# Patient Record
Sex: FEMALE | Race: WHITE | Employment: UNEMPLOYED | URBAN - METROPOLITAN AREA
[De-identification: names, ages, dates, MRNs, and addresses within clinical notes are randomized per-mention and may not be internally consistent; named-entity substitution may affect disease eponyms.]

---

## 2017-01-17 ENCOUNTER — HOSPITAL ENCOUNTER (EMERGENCY)
Age: 27
Discharge: HOME | End: 2017-01-17
Attending: INTERNAL MEDICINE
Payer: SELF-PAY

## 2017-01-17 VITALS
SYSTOLIC BLOOD PRESSURE: 114 MMHG | HEIGHT: 65 IN | TEMPERATURE: 98.1 F | HEART RATE: 80 BPM | WEIGHT: 211 LBS | DIASTOLIC BLOOD PRESSURE: 82 MMHG | BODY MASS INDEX: 35.16 KG/M2 | RESPIRATION RATE: 18 BRPM | OXYGEN SATURATION: 100 %

## 2017-01-17 DIAGNOSIS — K12.2 CELLULITIS OF MOUTH: Primary | ICD-10-CM

## 2017-01-17 LAB
AMPHET UR QL SCN: NEGATIVE
ANION GAP BLD CALC-SCNC: 8 MMOL/L (ref 5–15)
APPEARANCE UR: ABNORMAL
BACTERIA URNS QL MICRO: ABNORMAL /HPF
BARBITURATES UR QL SCN: NEGATIVE
BASOPHILS # BLD AUTO: 0 K/UL (ref 0–0.1)
BASOPHILS # BLD: 0 % (ref 0–1)
BENZODIAZ UR QL: NEGATIVE
BILIRUB UR QL CFM: NEGATIVE
BUN SERPL-MCNC: 6 MG/DL (ref 6–20)
BUN/CREAT SERPL: 8 (ref 12–20)
CALCIUM SERPL-MCNC: 9.1 MG/DL (ref 8.5–10.1)
CANNABINOIDS UR QL SCN: NEGATIVE
CHLORIDE SERPL-SCNC: 104 MMOL/L (ref 97–108)
CO2 SERPL-SCNC: 29 MMOL/L (ref 21–32)
COCAINE UR QL SCN: POSITIVE
COLOR UR: ABNORMAL
CREAT SERPL-MCNC: 0.75 MG/DL (ref 0.55–1.02)
DRUG SCRN COMMENT,DRGCM: ABNORMAL
EOSINOPHIL # BLD: 0.2 K/UL (ref 0–0.4)
EOSINOPHIL NFR BLD: 2 % (ref 0–7)
EPITH CASTS URNS QL MICRO: ABNORMAL /LPF
ERYTHROCYTE [DISTWIDTH] IN BLOOD BY AUTOMATED COUNT: 14 % (ref 11.5–14.5)
GLUCOSE SERPL-MCNC: 88 MG/DL (ref 65–100)
GLUCOSE UR STRIP.AUTO-MCNC: NEGATIVE MG/DL
HCG UR QL: NEGATIVE
HCT VFR BLD AUTO: 39.3 % (ref 35–47)
HGB BLD-MCNC: 12.5 G/DL (ref 11.5–16)
HGB UR QL STRIP: ABNORMAL
KETONES UR QL STRIP.AUTO: NEGATIVE MG/DL
LEUKOCYTE ESTERASE UR QL STRIP.AUTO: ABNORMAL
LYMPHOCYTES # BLD AUTO: 22 % (ref 12–49)
LYMPHOCYTES # BLD: 2 K/UL (ref 0.8–3.5)
MCH RBC QN AUTO: 28.5 PG (ref 26–34)
MCHC RBC AUTO-ENTMCNC: 31.8 G/DL (ref 30–36.5)
MCV RBC AUTO: 89.7 FL (ref 80–99)
METHADONE UR QL: NEGATIVE
MONOCYTES # BLD: 0.7 K/UL (ref 0–1)
MONOCYTES NFR BLD AUTO: 8 % (ref 5–13)
NEUTS SEG # BLD: 6.2 K/UL (ref 1.8–8)
NEUTS SEG NFR BLD AUTO: 68 % (ref 32–75)
NITRITE UR QL STRIP.AUTO: NEGATIVE
OPIATES UR QL: POSITIVE
PCP UR QL: NEGATIVE
PH UR STRIP: 7 [PH] (ref 5–8)
PLATELET # BLD AUTO: 247 K/UL (ref 150–400)
POTASSIUM SERPL-SCNC: 3.8 MMOL/L (ref 3.5–5.1)
PROT UR STRIP-MCNC: ABNORMAL MG/DL
RBC # BLD AUTO: 4.38 M/UL (ref 3.8–5.2)
RBC #/AREA URNS HPF: ABNORMAL /HPF (ref 0–5)
SODIUM SERPL-SCNC: 141 MMOL/L (ref 136–145)
SP GR UR REFRACTOMETRY: 1.02 (ref 1–1.03)
UA: UC IF INDICATED,UAUC: ABNORMAL
UROBILINOGEN UR QL STRIP.AUTO: 2 EU/DL (ref 0.2–1)
WBC # BLD AUTO: 9.2 K/UL (ref 3.6–11)
WBC URNS QL MICRO: ABNORMAL /HPF (ref 0–4)

## 2017-01-17 PROCEDURE — 99284 EMERGENCY DEPT VISIT MOD MDM: CPT

## 2017-01-17 PROCEDURE — 81025 URINE PREGNANCY TEST: CPT

## 2017-01-17 PROCEDURE — 96361 HYDRATE IV INFUSION ADD-ON: CPT

## 2017-01-17 PROCEDURE — 85025 COMPLETE CBC W/AUTO DIFF WBC: CPT | Performed by: INTERNAL MEDICINE

## 2017-01-17 PROCEDURE — 96365 THER/PROPH/DIAG IV INF INIT: CPT

## 2017-01-17 PROCEDURE — 80307 DRUG TEST PRSMV CHEM ANLYZR: CPT | Performed by: INTERNAL MEDICINE

## 2017-01-17 PROCEDURE — 81001 URINALYSIS AUTO W/SCOPE: CPT | Performed by: INTERNAL MEDICINE

## 2017-01-17 PROCEDURE — 80048 BASIC METABOLIC PNL TOTAL CA: CPT | Performed by: INTERNAL MEDICINE

## 2017-01-17 PROCEDURE — 74011250637 HC RX REV CODE- 250/637: Performed by: INTERNAL MEDICINE

## 2017-01-17 PROCEDURE — 96375 TX/PRO/DX INJ NEW DRUG ADDON: CPT

## 2017-01-17 PROCEDURE — 87086 URINE CULTURE/COLONY COUNT: CPT | Performed by: INTERNAL MEDICINE

## 2017-01-17 PROCEDURE — 74011250636 HC RX REV CODE- 250/636: Performed by: INTERNAL MEDICINE

## 2017-01-17 PROCEDURE — 36415 COLL VENOUS BLD VENIPUNCTURE: CPT | Performed by: INTERNAL MEDICINE

## 2017-01-17 RX ORDER — MUPIROCIN 20 MG/G
OINTMENT TOPICAL 3 TIMES DAILY
Qty: 22 G | Refills: 0 | Status: SHIPPED | OUTPATIENT
Start: 2017-01-17 | End: 2021-09-09

## 2017-01-17 RX ORDER — ACETAMINOPHEN AND CODEINE PHOSPHATE 300; 30 MG/1; MG/1
1 TABLET ORAL
Qty: 20 TAB | Refills: 0 | Status: SHIPPED | OUTPATIENT
Start: 2017-01-17 | End: 2021-09-09

## 2017-01-17 RX ORDER — CEPHALEXIN 500 MG/1
500 CAPSULE ORAL 4 TIMES DAILY
Qty: 28 CAP | Refills: 0 | Status: SHIPPED | OUTPATIENT
Start: 2017-01-17 | End: 2017-01-24

## 2017-01-17 RX ORDER — ONDANSETRON 2 MG/ML
4 INJECTION INTRAMUSCULAR; INTRAVENOUS
Status: COMPLETED | OUTPATIENT
Start: 2017-01-17 | End: 2017-01-17

## 2017-01-17 RX ORDER — ACETAMINOPHEN 500 MG
1000 TABLET ORAL ONCE
Status: COMPLETED | OUTPATIENT
Start: 2017-01-17 | End: 2017-01-17

## 2017-01-17 RX ORDER — IBUPROFEN 800 MG/1
800 TABLET ORAL
Qty: 20 TAB | Refills: 0 | Status: SHIPPED | OUTPATIENT
Start: 2017-01-17 | End: 2017-01-24

## 2017-01-17 RX ORDER — CLINDAMYCIN PHOSPHATE 600 MG/50ML
600 INJECTION INTRAVENOUS
Status: COMPLETED | OUTPATIENT
Start: 2017-01-17 | End: 2017-01-17

## 2017-01-17 RX ORDER — SULFAMETHOXAZOLE AND TRIMETHOPRIM 800; 160 MG/1; MG/1
1 TABLET ORAL 2 TIMES DAILY
Qty: 14 TAB | Refills: 0 | Status: SHIPPED | OUTPATIENT
Start: 2017-01-17 | End: 2017-01-24

## 2017-01-17 RX ORDER — KETOROLAC TROMETHAMINE 30 MG/ML
30 INJECTION, SOLUTION INTRAMUSCULAR; INTRAVENOUS
Status: COMPLETED | OUTPATIENT
Start: 2017-01-17 | End: 2017-01-17

## 2017-01-17 RX ORDER — HYDROMORPHONE HYDROCHLORIDE 1 MG/ML
1 INJECTION, SOLUTION INTRAMUSCULAR; INTRAVENOUS; SUBCUTANEOUS
Status: COMPLETED | OUTPATIENT
Start: 2017-01-17 | End: 2017-01-17

## 2017-01-17 RX ADMIN — KETOROLAC TROMETHAMINE 30 MG: 30 INJECTION, SOLUTION INTRAMUSCULAR; INTRAVENOUS at 16:15

## 2017-01-17 RX ADMIN — ACETAMINOPHEN 1000 MG: 500 TABLET ORAL at 16:12

## 2017-01-17 RX ADMIN — HYDROMORPHONE HYDROCHLORIDE 1 MG: 1 INJECTION, SOLUTION INTRAMUSCULAR; INTRAVENOUS; SUBCUTANEOUS at 16:15

## 2017-01-17 RX ADMIN — ONDANSETRON HYDROCHLORIDE 4 MG: 2 SOLUTION INTRAMUSCULAR; INTRAVENOUS at 16:15

## 2017-01-17 RX ADMIN — CLINDAMYCIN PHOSPHATE 600 MG: 600 INJECTION INTRAVENOUS at 16:27

## 2017-01-17 RX ADMIN — SODIUM CHLORIDE 1000 ML: 900 INJECTION, SOLUTION INTRAVENOUS at 16:13

## 2017-01-17 NOTE — DISCHARGE INSTRUCTIONS

## 2017-01-17 NOTE — ED PROVIDER NOTES
HPI Comments: Linsey Hernandez, 27 y.o. female, presents via EMS to [facility] ED with cc of progressively worsening lower lip swelling and lower facial swelling with associated 10/10 facial pain x 2 days. The patient states that onset of symptoms occurred after she popped pimples that were present on her face. The patient notes that she used heroin two days ago. The patient denies history of MRSA, significant medical history, or chance of pregnancy. The patient specifically denies SOB, hand swelling, or other acute complaints at this time. PCP: None    Social history significant for: + Tobacco, + EtOH, + Illicit Drug Use    There are no other complaints, changes, or physical findings at this time. Written by AARON Espinal, as dictated by Jolanta Keita MD.    The history is provided by the patient. No  was used. History reviewed. No pertinent past medical history. History reviewed. No pertinent past surgical history. History reviewed. No pertinent family history. Social History     Social History    Marital status: SINGLE     Spouse name: N/A    Number of children: N/A    Years of education: N/A     Occupational History    Not on file. Social History Main Topics    Smoking status: Current Every Day Smoker    Smokeless tobacco: Not on file    Alcohol use Yes    Drug use: Yes     Special: Heroin    Sexual activity: Not on file     Other Topics Concern    Not on file     Social History Narrative    No narrative on file     ALLERGIES: Review of patient's allergies indicates no known allergies. Review of Systems   Constitutional: Negative for chills, fatigue and fever. HENT: Positive for facial swelling. Negative for congestion, rhinorrhea and sore throat. (+) Lower lip swelling  (+) Facial pain   Eyes: Negative for pain, discharge and visual disturbance. Respiratory: Negative for cough, chest tightness, shortness of breath and wheezing. Cardiovascular: Negative for chest pain, palpitations and leg swelling. Gastrointestinal: Negative for abdominal pain, constipation, diarrhea, nausea and vomiting. Genitourinary: Negative for dysuria, frequency and hematuria. Musculoskeletal: Negative for arthralgias, back pain, joint swelling (Hand swelling) and myalgias. Skin: Negative for rash. Neurological: Negative for dizziness, weakness, light-headedness and headaches. Psychiatric/Behavioral: Negative. All other systems reviewed and are negative. Vitals:    01/17/17 1516   BP: 114/82   Pulse: 80   Resp: 18   Temp: 98.1 °F (36.7 °C)   SpO2: 100%   Weight: 95.7 kg (211 lb)   Height: 5' 5\" (1.651 m)            Physical Exam   Constitutional: She is oriented to person, place, and time. She appears well-developed and well-nourished. HENT:   Head: Normocephalic and atraumatic. Mouth/Throat: Oropharynx is clear and moist.   Lower lip swelling and lower facial swelling consistent with cellulitis  No tongue swelling   Eyes: Conjunctivae and EOM are normal. Pupils are equal, round, and reactive to light. Neck: Normal range of motion. Neck supple. Cardiovascular: Normal rate, regular rhythm and normal heart sounds. Exam reveals no gallop and no friction rub. No murmur heard. Pulmonary/Chest: Effort normal and breath sounds normal. No respiratory distress. She has no wheezes. She has no rales. Abdominal: Soft. Bowel sounds are normal. She exhibits no distension. There is no tenderness. There is no rebound and no guarding. Musculoskeletal: Normal range of motion. She exhibits no edema or tenderness. Lymphadenopathy:     She has no cervical adenopathy. Neurological: She is alert and oriented to person, place, and time. She has normal strength. No cranial nerve deficit or sensory deficit. She displays a negative Romberg sign. Coordination and gait normal.   Skin: Skin is warm and dry. No ecchymosis, no lesion and no rash noted. Rash is not urticarial. She is not diaphoretic. No erythema. Psychiatric: She has a normal mood and affect. Nursing note and vitals reviewed. Written by Delia Russell ED Scribe, as dictated by Estella Marshall MD.    MDM  Number of Diagnoses or Management Options  Diagnosis management comments:   DDx: Facial cellulitis, MSSA vs MRSA, skin popping, heroin/drug abuse, angioedema       Amount and/or Complexity of Data Reviewed  Clinical lab tests: ordered and reviewed  Review and summarize past medical records: yes    Patient Progress  Patient progress: stable    ED Course       Procedures    Progress Note:  5:09 PM  The patient was re-evaluated and reports that she is feeling better. Written by Delia Russell ED Scribe, as dictated by Estella Marshall MD.    LABORATORY TESTS:  Recent Results (from the past 12 hour(s))   CBC WITH AUTOMATED DIFF    Collection Time: 01/17/17  4:07 PM   Result Value Ref Range    WBC 9.2 3.6 - 11.0 K/uL    RBC 4.38 3.80 - 5.20 M/uL    HGB 12.5 11.5 - 16.0 g/dL    HCT 39.3 35.0 - 47.0 %    MCV 89.7 80.0 - 99.0 FL    MCH 28.5 26.0 - 34.0 PG    MCHC 31.8 30.0 - 36.5 g/dL    RDW 14.0 11.5 - 14.5 %    PLATELET 457 597 - 055 K/uL    NEUTROPHILS 68 32 - 75 %    LYMPHOCYTES 22 12 - 49 %    MONOCYTES 8 5 - 13 %    EOSINOPHILS 2 0 - 7 %    BASOPHILS 0 0 - 1 %    ABS. NEUTROPHILS 6.2 1.8 - 8.0 K/UL    ABS. LYMPHOCYTES 2.0 0.8 - 3.5 K/UL    ABS. MONOCYTES 0.7 0.0 - 1.0 K/UL    ABS. EOSINOPHILS 0.2 0.0 - 0.4 K/UL    ABS.  BASOPHILS 0.0 0.0 - 0.1 K/UL   METABOLIC PANEL, BASIC    Collection Time: 01/17/17  4:07 PM   Result Value Ref Range    Sodium 141 136 - 145 mmol/L    Potassium 3.8 3.5 - 5.1 mmol/L    Chloride 104 97 - 108 mmol/L    CO2 29 21 - 32 mmol/L    Anion gap 8 5 - 15 mmol/L    Glucose 88 65 - 100 mg/dL    BUN 6 6 - 20 MG/DL    Creatinine 0.75 0.55 - 1.02 MG/DL    BUN/Creatinine ratio 8 (L) 12 - 20      GFR est AA >60 >60 ml/min/1.73m2    GFR est non-AA >60 >60 ml/min/1.73m2    Calcium 9.1 8.5 - 10.1 MG/DL   HCG URINE, QL. - POC    Collection Time: 01/17/17  5:29 PM   Result Value Ref Range    Pregnancy test,urine (POC) NEGATIVE  NEG       MEDICATIONS GIVEN:  Medications   HYDROmorphone (PF) (DILAUDID) injection 1 mg (1 mg IntraVENous Given 1/17/17 1615)   ondansetron (ZOFRAN) injection 4 mg (4 mg IntraVENous Given 1/17/17 1615)   clindamycin (CLEOCIN) 600mg D5W 50mL IVPB (premix) (0 mg IntraVENous IV Completed 1/17/17 1735)   acetaminophen (TYLENOL) tablet 1,000 mg (1,000 mg Oral Given 1/17/17 1612)   ketorolac (TORADOL) injection 30 mg (30 mg IntraVENous Given 1/17/17 1615)   sodium chloride 0.9 % bolus infusion (1,000 mL IntraVENous New Bag 1/17/17 1613)       IMPRESSION:  1. Cellulitis of mouth        PLAN:  1. Current Discharge Medication List      START taking these medications    Details   cephALEXin (KEFLEX) 500 mg capsule Take 1 Cap by mouth four (4) times daily for 7 days. Qty: 28 Cap, Refills: 0      trimethoprim-sulfamethoxazole (BACTRIM DS) 160-800 mg per tablet Take 1 Tab by mouth two (2) times a day for 7 days. Qty: 14 Tab, Refills: 0      mupirocin (BACTROBAN) 2 % ointment Apply  to affected area three (3) times daily. Apply to area for 10 days  Qty: 22 g, Refills: 0      ibuprofen (MOTRIN) 800 mg tablet Take 1 Tab by mouth every six (6) hours as needed for Pain for up to 7 days. Qty: 20 Tab, Refills: 0      acetaminophen-codeine (TYLENOL-CODEINE #3) 300-30 mg per tablet Take 1 Tab by mouth every six (6) hours as needed for Pain. Max Daily Amount: 4 Tabs. Qty: 20 Tab, Refills: 0           2. Follow-up Information     Follow up With Details Comments Contact Info    [facility] EMERGENCY DEPT   Estefany Toussaint        Return to ED if worse     Discharge Note:  5:36 PM  The pt is ready for discharge. The pt's signs, symptoms, diagnosis, and discharge instructions have been discussed and pt has conveyed their understanding.  The pt is to follow up as recommended or return to ER should their symptoms worsen. Plan has been discussed and pt is in agreement. This note is prepared by Rosa Zhang, acting as a Scribe for Uri Coyle MD.    Uri Coyle MD: The scribe's documentation has been prepared under my direction and personally reviewed by me in its entirety. I confirm that the notes above accurately reflects all work, treatment, procedures, and medical decision making performed by me.

## 2017-01-17 NOTE — ED NOTES
Per pt reports right lower jaw and right lower lip swelling x 2 days after popping a pimple. No tongue swelling noted, denies difficulty swallowing and sob.

## 2017-01-17 NOTE — ED NOTES
Patient (s)  given copy of dc instructions and 5 script(s). Patient(s)  verbalized understanding of instructions and script (s). Patient given a current medication reconciliation form and verbalized understanding of their medications. Patient (s) verbalized understanding of the importance of discussing medications with  his or her physician or clinic when they follow up. Patient alert and oriented and in no acute distress. Pt verbalizes pain scale of 8 out of 10. Patient discharged home ambulatory with .

## 2017-01-17 NOTE — ED TRIAGE NOTES
Pt arrives by EMS with extremely swollen right lower lip and face. Pt reports she popped two pimples two days ago and mouth has been swelling since then.

## 2017-01-17 NOTE — ED NOTES
Patient requesting that MD drain the swelling on her face, pt advised that it's not an abscess. Patient still would like to speak to MD. MD will be notified.

## 2017-01-17 NOTE — ED NOTES
MD at bedside, advising patient that the cellulitis can't be drained. Pt requesting stronger pain medication based on her history. MD advised patient that she need to take what was prescribed.

## 2017-01-19 LAB
BACTERIA SPEC CULT: NORMAL
CC UR VC: NORMAL
SERVICE CMNT-IMP: NORMAL

## 2018-12-18 ENCOUNTER — OFFICE VISIT (OUTPATIENT)
Dept: SURGERY | Age: 28
End: 2018-12-18

## 2018-12-18 VITALS
BODY MASS INDEX: 31.44 KG/M2 | SYSTOLIC BLOOD PRESSURE: 107 MMHG | WEIGHT: 188.7 LBS | DIASTOLIC BLOOD PRESSURE: 74 MMHG | TEMPERATURE: 98.9 F | HEART RATE: 79 BPM | HEIGHT: 65 IN | OXYGEN SATURATION: 100 %

## 2018-12-18 DIAGNOSIS — N63.0 BREAST MASS IN FEMALE: Primary | ICD-10-CM

## 2018-12-18 RX ORDER — NAPROXEN 375 MG/1
375 TABLET ORAL 2 TIMES DAILY WITH MEALS
COMMUNITY
End: 2021-09-09

## 2018-12-18 RX ORDER — IBUPROFEN 600 MG/1
TABLET ORAL 2 TIMES DAILY
COMMUNITY
End: 2021-09-09

## 2018-12-18 NOTE — PROGRESS NOTES
Chief Complaint   Patient presents with    Breast Mass     SEEN AT THE REQUEST OF LISANDRA FLORES FOR 2 BREAST MASSES     1. Have you been to the ER, urgent care clinic since your last visit? Hospitalized since your last visit? NO    2. Have you seen or consulted any other health care providers outside of the [address] [place] [address] since your last visit? Include any pap smears or colon screening.  NO

## 2018-12-21 NOTE — PROGRESS NOTES
To: Cristi Branham NP      From: Ibrahima Sanchez MD    Thank you for sending Debbi Granda to see us. Encounter Date: 12/18/2018  History and Physical    Assessment:     Right breast mass, possibly fluid/hematoma. No known trauma. Plan: Will arrange for percutaneous biopsy. Depending on results, may require referral to a breast surgeon. Subjective:      Debbi Granda is a 34 y.o. female who who is seen in consultation at the request of Cristi Branham NP for evaluation of a new right  breast lump. Patient noticed it. No much pain. Does not recall trauma. Came up quickly. Family history of breast cancer: maternal aunt. Age at menarche: 8.  Age of first pregnancy: 15.  Number of term pregnancies: 4. Age at menopause: na.  Total years of OCP/HRT: 0.  Breast biopsies: 0  SBE's: yes    History reviewed. No pertinent past medical history. Past Surgical History:   Procedure Laterality Date    HX CHOLECYSTECTOMY      HX HERNIA REPAIR      RT. HERNIA REPAIR      Family History   Problem Relation Age of Onset    Hypertension Father     Diabetes Father     Hypertension Paternal Aunt     Cancer Maternal Grandmother     Heart Disease Maternal Grandfather     Cancer Maternal Grandfather     Hypertension Paternal Grandfather      Social History     Tobacco Use    Smoking status: Current Every Day Smoker    Smokeless tobacco: Current User   Substance Use Topics    Alcohol use: No     Frequency: Never     Comment: LAST DRINK 10/10/18      Current Outpatient Medications   Medication Sig    ibuprofen (MOTRIN) 600 mg tablet Take  by mouth two (2) times a day.  naproxen (NAPROSYN) 375 mg tablet Take 375 mg by mouth two (2) times daily (with meals).  trazodone HCl (TRAZODONE PO) Take  by mouth daily.  citalopram hydrobromide (CELEXA PO) Take  by mouth daily.  mupirocin (BACTROBAN) 2 % ointment Apply  to affected area three (3) times daily.  Apply to area for 10 days    acetaminophen-codeine (TYLENOL-CODEINE #3) 300-30 mg per tablet Take 1 Tab by mouth every six (6) hours as needed for Pain. Max Daily Amount: 4 Tabs. No current facility-administered medications for this visit. Allergies   Allergen Reactions    Tramadol Other (comments)       Review of Systems: 10 systems reviewed. See scanned sheet in \"Media\" section. See HPI for pertinent positives and negatives. Objective:        Visit Vitals  /74 (BP 1 Location: Right arm, BP Patient Position: Sitting)   Pulse 79   Temp 98.9 °F (37.2 °C)   Ht 5' 5\" (1.651 m)   Wt 85.6 kg (188 lb 11.2 oz)   SpO2 100%   BMI 31.40 kg/m²        Physical Exam:  General appearance  Alert, cooperative, no distress, appears stated age       Eyes  Anicteric. Neck Supple       Lungs   Clear to auscultation bilaterally   Heart  Regular rate and rhythm. No murmur, rub or gallop   Abdomen   Soft. Bowel sounds normal. No masses,  No organomegaly. Extremities no cyanosis or edema   Pulses 2+ and symmetric   Skin Skin color, texture, turgor normal.    Lymph nodes Cervical, supraclavicular, and axillary nodes normal.   Neurologic Without overt sensory or motor deficit     Breast exam: left breast appear normal, no suspicious masses, no skin or nipple changes or axillary nodes. Right with palpable mass behind nipple. Not tender. No skin changes. On US, appears fluid.          Signed By: Ibrahima Sanchez MD

## 2019-01-09 ENCOUNTER — TELEPHONE (OUTPATIENT)
Dept: MAMMOGRAPHY | Age: 29
End: 2019-01-09

## 2019-01-09 NOTE — TELEPHONE ENCOUNTER
Called Dr. Mcwilliams's office and spoke with nurse Mayte Mullins regarding pt.'s scheduled procedure per [facility], at Dr. Erin Gonzalez office, and Manny Mora, [employer] tech  For 1/16/2019 at 1:30 p.m. (U/S Guided Left breast biopsy with post clip insertion). Informed Karla this procedure must be scheduled with the nurses or Oscar Ramirez in U/[facility] and the patient has no prior imaging at [facility]. Informed Karla we cancelled the appt. And Anastasia, [facility] [employer] stating pt. Needing U/S 1st then possibly schedule for procedure. Karla states Marjorie did not know how to handle this order correctly along with [facility] approving. Myke in U/S made aware. Mayte Mullins states she will notify Dr. Sofiya Kim. Informed Karla to please call back and we will help her with scheduling if pt. Needs.

## 2019-01-14 ENCOUNTER — TELEPHONE (OUTPATIENT)
Dept: MAMMOGRAPHY | Age: 29
End: 2019-01-14

## 2019-01-14 DIAGNOSIS — N64.9 BREAST LESION: Primary | ICD-10-CM

## 2019-01-14 NOTE — TELEPHONE ENCOUNTER
[address] [place] [address] and spoke with Tameka in the medical dept. And she states their  is not in yet - call back around noon to get her rescheduled with Angela Dyson ([phone] ext [phone]).

## 2019-01-14 NOTE — TELEPHONE ENCOUNTER
Received call from Coordination of Care May Cohn) requesting to schedule pt for her breast biopsy - Trish Presser had Anand Grande (rep. From [facility] on the line). Alisha informed Trish Presser availability of 1/23 or 1/25. Toribio Mable date of 1/23/2019 at 0930 with arrival time of 0830 here at [facility] and Anand Grande agreed to date/time/place. Carmenza Barksdale at [phone] ext [phone] to assist in filling out biopsy form and to inform Anand Grande that the patient can eat prior to her procedure. Anand Grande states she only takes Naproxen 375 mg (informed Anand Grande that pt. Should not take the Naproxen on Wednesday prior to her procedure and Anand Grande states \"that is fine\").     Michael Metcalf in U/S to place pt on the wall calendar for above date/ti

## 2019-01-14 NOTE — TELEPHONE ENCOUNTER
Called Dr. Mcwilliams's office and spoke with [facility] regarding pt being rescheduled. Marjorie states pt had U/S in their office-ask if they could send on CC or PACS and Marjorie states \"I don't think we can do that\". Then inquired if a disk could be made and we could upload to PACS and Marjorie said \"they can't do that\". Informed Marjorie since we have no imaging here at [address] for the doctors to evaluate, we would schedule her for an U/S of her breast with ? Biopsy same day per Dr. Winston Settles request ([facility] and problems scheduling at different times). Marjorie informed me of the phone number to call and arrange pt.'s appt. ([phone] ext [phone]).

## 2019-01-14 NOTE — TELEPHONE ENCOUNTER
Called Dr. Mcwilliams's office and spoke with [facility] informing her of the patients appt. Date/time/place. Encouraged to call back for any other questions/concerns regarding pt.'s appt.

## 2019-01-23 ENCOUNTER — HOSPITAL ENCOUNTER (OUTPATIENT)
Dept: ULTRASOUND IMAGING | Age: 29
Discharge: HOME | End: 2019-01-23
Attending: SURGERY
Payer: SELF-PAY

## 2019-01-23 ENCOUNTER — HOSPITAL ENCOUNTER (OUTPATIENT)
Dept: MAMMOGRAPHY | Age: 29
Discharge: HOME | End: 2019-01-23
Attending: SURGERY
Payer: SELF-PAY

## 2019-01-23 VITALS — WEIGHT: 196 LBS | HEIGHT: 64 IN | BODY MASS INDEX: 33.46 KG/M2

## 2019-01-23 DIAGNOSIS — N63.0 BREAST MASS IN FEMALE: ICD-10-CM

## 2019-01-23 DIAGNOSIS — R92.8 ABNORMAL MAMMOGRAM OF RIGHT BREAST: ICD-10-CM

## 2019-01-23 PROCEDURE — 19083 BX BREAST 1ST LESION US IMAG: CPT

## 2019-01-23 PROCEDURE — 87205 SMEAR GRAM STAIN: CPT

## 2019-01-23 PROCEDURE — 19000 PUNCTURE ASPIR CYST BREAST: CPT

## 2019-01-23 PROCEDURE — 87075 CULTR BACTERIA EXCEPT BLOOD: CPT

## 2019-01-23 RX ORDER — LIDOCAINE HYDROCHLORIDE 10 MG/ML
5 INJECTION, SOLUTION EPIDURAL; INFILTRATION; INTRACAUDAL; PERINEURAL
Status: DISPENSED | OUTPATIENT
Start: 2019-01-23 | End: 2019-01-23

## 2019-01-23 NOTE — PROGRESS NOTES
Received pt. In U/S with cuffs and shackles on. Pt awake, alert and oriented x3. Pt states no pain but she can feel it if she lays on it.

## 2019-01-23 NOTE — PROGRESS NOTES
Pt discharged with verbal instructions per Dr. Ilda Vidal and nurse. Bandaid placed on site per U/S [employer].

## 2019-01-23 NOTE — PROGRESS NOTES
Biopsy cancelled - Dr. Chica Durán aspirated fluid from right breast - requested to send for cultures (aerobic and anaerobic).

## 2019-01-27 LAB
BACTERIA SPEC CULT: NORMAL
SERVICE CMNT-IMP: NORMAL

## 2019-01-29 ENCOUNTER — TELEPHONE (OUTPATIENT)
Dept: MAMMOGRAPHY | Age: 29
End: 2019-01-29

## 2019-01-29 NOTE — TELEPHONE ENCOUNTER
Routed culture results to Dr. Nimco Palacios and spoke w/his nurse, Francisco Murphy which advised to fax them to her - w/confirmation received.

## 2019-01-29 NOTE — TELEPHONE ENCOUNTER
Dr AIDE Barclay advised of culture results - advised to notify Dr. Corey Gordon - faxed and advised office, see previous note

## 2019-01-30 LAB
BACTERIA SPEC CULT: NORMAL
BACTERIA SPEC CULT: NORMAL
GRAM STN SPEC: NORMAL
GRAM STN SPEC: NORMAL
SERVICE CMNT-IMP: NORMAL

## 2021-09-09 ENCOUNTER — APPOINTMENT (OUTPATIENT)
Dept: GENERAL RADIOLOGY | Age: 31
End: 2021-09-09
Attending: EMERGENCY MEDICINE
Payer: MEDICAID

## 2021-09-09 ENCOUNTER — HOSPITAL ENCOUNTER (EMERGENCY)
Age: 31
Discharge: HOME | End: 2021-09-09
Attending: EMERGENCY MEDICINE
Payer: MEDICAID

## 2021-09-09 VITALS
RESPIRATION RATE: 16 BRPM | OXYGEN SATURATION: 96 % | HEIGHT: 64 IN | HEART RATE: 72 BPM | TEMPERATURE: 98.9 F | DIASTOLIC BLOOD PRESSURE: 62 MMHG | WEIGHT: 170 LBS | SYSTOLIC BLOOD PRESSURE: 99 MMHG | BODY MASS INDEX: 29.02 KG/M2

## 2021-09-09 DIAGNOSIS — E87.6 HYPOKALEMIA: ICD-10-CM

## 2021-09-09 DIAGNOSIS — J18.9 COMMUNITY ACQUIRED PNEUMONIA OF RIGHT MIDDLE LOBE OF LUNG: ICD-10-CM

## 2021-09-09 DIAGNOSIS — N12 PYELONEPHRITIS: Primary | ICD-10-CM

## 2021-09-09 LAB
ALBUMIN SERPL-MCNC: 3.2 G/DL (ref 3.5–5)
ALBUMIN/GLOB SERPL: 0.7 {RATIO} (ref 1.1–2.2)
ALP SERPL-CCNC: 59 U/L (ref 45–117)
ALT SERPL-CCNC: 20 U/L (ref 12–78)
ANION GAP SERPL CALC-SCNC: 9 MMOL/L (ref 5–15)
APPEARANCE UR: ABNORMAL
AST SERPL-CCNC: 22 U/L (ref 15–37)
BACTERIA URNS QL MICRO: ABNORMAL /HPF
BASOPHILS # BLD: 0 K/UL (ref 0–0.1)
BASOPHILS NFR BLD: 0 % (ref 0–1)
BILIRUB SERPL-MCNC: 1 MG/DL (ref 0.2–1)
BILIRUB UR QL CFM: NEGATIVE
BUN SERPL-MCNC: 9 MG/DL (ref 6–20)
BUN/CREAT SERPL: 9 (ref 12–20)
CALCIUM SERPL-MCNC: 8.3 MG/DL (ref 8.5–10.1)
CHLORIDE SERPL-SCNC: 99 MMOL/L (ref 97–108)
CO2 SERPL-SCNC: 26 MMOL/L (ref 21–32)
COLOR UR: ABNORMAL
CREAT SERPL-MCNC: 0.97 MG/DL (ref 0.55–1.02)
DIFFERENTIAL METHOD BLD: ABNORMAL
EOSINOPHIL # BLD: 0 K/UL (ref 0–0.4)
EOSINOPHIL NFR BLD: 0 % (ref 0–7)
EPITH CASTS URNS QL MICRO: ABNORMAL /LPF
ERYTHROCYTE [DISTWIDTH] IN BLOOD BY AUTOMATED COUNT: 14.7 % (ref 11.5–14.5)
FLUAV RNA SPEC QL NAA+PROBE: NOT DETECTED
FLUBV RNA SPEC QL NAA+PROBE: NOT DETECTED
GLOBULIN SER CALC-MCNC: 4.9 G/DL (ref 2–4)
GLUCOSE SERPL-MCNC: 115 MG/DL (ref 65–100)
GLUCOSE UR STRIP.AUTO-MCNC: NEGATIVE MG/DL
HCT VFR BLD AUTO: 33.6 % (ref 35–47)
HGB BLD-MCNC: 10.7 G/DL (ref 11.5–16)
HGB UR QL STRIP: ABNORMAL
IMM GRANULOCYTES # BLD AUTO: 0.1 K/UL (ref 0–0.04)
IMM GRANULOCYTES NFR BLD AUTO: 1 % (ref 0–0.5)
KETONES UR QL STRIP.AUTO: NEGATIVE MG/DL
LACTATE SERPL-SCNC: 1 MMOL/L (ref 0.4–2)
LEUKOCYTE ESTERASE UR QL STRIP.AUTO: ABNORMAL
LYMPHOCYTES # BLD: 1.4 K/UL (ref 0.8–3.5)
LYMPHOCYTES NFR BLD: 11 % (ref 12–49)
MCH RBC QN AUTO: 25.9 PG (ref 26–34)
MCHC RBC AUTO-ENTMCNC: 31.8 G/DL (ref 30–36.5)
MCV RBC AUTO: 81.4 FL (ref 80–99)
MONOCYTES # BLD: 1.3 K/UL (ref 0–1)
MONOCYTES NFR BLD: 10 % (ref 5–13)
NEUTS SEG # BLD: 10 K/UL (ref 1.8–8)
NEUTS SEG NFR BLD: 78 % (ref 32–75)
NITRITE UR QL STRIP.AUTO: POSITIVE
NRBC # BLD: 0 K/UL (ref 0–0.01)
NRBC BLD-RTO: 0 PER 100 WBC
PH UR STRIP: 5.5 [PH] (ref 5–8)
PLATELET # BLD AUTO: 195 K/UL (ref 150–400)
PMV BLD AUTO: 9.3 FL (ref 8.9–12.9)
POTASSIUM SERPL-SCNC: 3.1 MMOL/L (ref 3.5–5.1)
PROT SERPL-MCNC: 8.1 G/DL (ref 6.4–8.2)
PROT UR STRIP-MCNC: 30 MG/DL
RBC # BLD AUTO: 4.13 M/UL (ref 3.8–5.2)
RBC #/AREA URNS HPF: ABNORMAL /HPF (ref 0–5)
SARS-COV-2, COV2: NOT DETECTED
SODIUM SERPL-SCNC: 134 MMOL/L (ref 136–145)
SP GR UR REFRACTOMETRY: 1.02 (ref 1–1.03)
TROPONIN I SERPL-MCNC: <0.05 NG/ML
UA: UC IF INDICATED,UAUC: ABNORMAL
UROBILINOGEN UR QL STRIP.AUTO: 1 EU/DL (ref 0.2–1)
WBC # BLD AUTO: 12.9 K/UL (ref 3.6–11)
WBC URNS QL MICRO: ABNORMAL /HPF (ref 0–4)

## 2021-09-09 PROCEDURE — 87077 CULTURE AEROBIC IDENTIFY: CPT

## 2021-09-09 PROCEDURE — 85025 COMPLETE CBC W/AUTO DIFF WBC: CPT

## 2021-09-09 PROCEDURE — 96361 HYDRATE IV INFUSION ADD-ON: CPT

## 2021-09-09 PROCEDURE — 71045 X-RAY EXAM CHEST 1 VIEW: CPT

## 2021-09-09 PROCEDURE — 83605 ASSAY OF LACTIC ACID: CPT

## 2021-09-09 PROCEDURE — 80053 COMPREHEN METABOLIC PANEL: CPT

## 2021-09-09 PROCEDURE — 87186 SC STD MICRODIL/AGAR DIL: CPT

## 2021-09-09 PROCEDURE — 84484 ASSAY OF TROPONIN QUANT: CPT

## 2021-09-09 PROCEDURE — 96374 THER/PROPH/DIAG INJ IV PUSH: CPT

## 2021-09-09 PROCEDURE — 75810000457 HC INSERT PICC CATHETER LVL 3 5183

## 2021-09-09 PROCEDURE — 74011250636 HC RX REV CODE- 250/636: Performed by: EMERGENCY MEDICINE

## 2021-09-09 PROCEDURE — 74011250637 HC RX REV CODE- 250/637: Performed by: EMERGENCY MEDICINE

## 2021-09-09 PROCEDURE — 81001 URINALYSIS AUTO W/SCOPE: CPT

## 2021-09-09 PROCEDURE — 87040 BLOOD CULTURE FOR BACTERIA: CPT

## 2021-09-09 PROCEDURE — 99284 EMERGENCY DEPT VISIT MOD MDM: CPT

## 2021-09-09 PROCEDURE — 87086 URINE CULTURE/COLONY COUNT: CPT

## 2021-09-09 PROCEDURE — 74011000250 HC RX REV CODE- 250: Performed by: EMERGENCY MEDICINE

## 2021-09-09 PROCEDURE — 36415 COLL VENOUS BLD VENIPUNCTURE: CPT

## 2021-09-09 PROCEDURE — 87636 SARSCOV2 & INF A&B AMP PRB: CPT

## 2021-09-09 PROCEDURE — 87106 FUNGI IDENTIFICATION YEAST: CPT

## 2021-09-09 PROCEDURE — 93005 ELECTROCARDIOGRAM TRACING: CPT

## 2021-09-09 RX ORDER — POTASSIUM CHLORIDE 750 MG/1
40 TABLET, FILM COATED, EXTENDED RELEASE ORAL
Status: COMPLETED | OUTPATIENT
Start: 2021-09-09 | End: 2021-09-09

## 2021-09-09 RX ORDER — ACETAMINOPHEN 500 MG
1000 TABLET ORAL ONCE
Status: COMPLETED | OUTPATIENT
Start: 2021-09-09 | End: 2021-09-09

## 2021-09-09 RX ORDER — SODIUM CHLORIDE 0.9 % (FLUSH) 0.9 %
5-10 SYRINGE (ML) INJECTION AS NEEDED
Status: DISCONTINUED | OUTPATIENT
Start: 2021-09-09 | End: 2021-09-09 | Stop reason: HOSPADM

## 2021-09-09 RX ORDER — LEVOFLOXACIN 750 MG/1
750 TABLET ORAL DAILY
Qty: 7 TABLET | Refills: 0 | Status: SHIPPED | OUTPATIENT
Start: 2021-09-09 | End: 2021-09-16

## 2021-09-09 RX ADMIN — ACETAMINOPHEN 1000 MG: 500 TABLET ORAL at 17:35

## 2021-09-09 RX ADMIN — SODIUM CHLORIDE 641 ML: 9 INJECTION, SOLUTION INTRAVENOUS at 17:36

## 2021-09-09 RX ADMIN — SODIUM CHLORIDE 1000 ML: 9 INJECTION, SOLUTION INTRAVENOUS at 17:35

## 2021-09-09 RX ADMIN — POTASSIUM CHLORIDE 40 MEQ: 750 TABLET, EXTENDED RELEASE ORAL at 19:05

## 2021-09-09 RX ADMIN — CEFTRIAXONE SODIUM 1 G: 1 INJECTION, POWDER, FOR SOLUTION INTRAMUSCULAR; INTRAVENOUS at 18:06

## 2021-09-09 NOTE — ED TRIAGE NOTES
Pt in with fever, fatigue, shortness of breath, chest pain with cough x 3 days. Pt denies taking any medications today.

## 2021-09-09 NOTE — ED NOTES
Emergency Department Nursing Plan of Care       The Nursing Plan of Care is developed from the Nursing assessment and Emergency Department Attending provider initial evaluation. The plan of care may be reviewed in the ED Provider note.     The Plan of Care was developed with the following considerations:   Patient / Family readiness to learn indicated by:verbalized understanding  Persons(s) to be included in education: patient  Barriers to Learning/Limitations:No    Signed     Adrienne Del Real    9/9/2021   5:03 PM

## 2021-09-09 NOTE — ED PROVIDER NOTES
EMERGENCY DEPARTMENT HISTORY AND PHYSICAL EXAM      Date: 9/9/2021  Patient Name: Ozzy Boston    History of Presenting Illness     Chief Complaint   Patient presents with    Fever    Fatigue     History Provided By: Patient    HPI: Ozzy Boston, 31 y.o. female with no significant past medical history who presents via private vehicle to the ED with cc of subjective fevers, shortness of breath, cough, fatigue, and right sided chest/flank pain for the past 3 days. Patient denies taking any medications for the symptoms. She does state that her fevers and chills have waxed and waned. She denies any sick contacts, specifically any COVID-19 exposures. She is unvaccinated for COVID-19. She does inject heroin daily and uses cocaine. She endorses dysuria and urinary frequency but denies any GI symptoms. Her cough is nonproductive. Coughing and taking deep breaths make her right flank/chest pain worse and nothing makes her pain better. She describes the pain as a dull/aching pain that does not radiate. PMHx: Polysubstance abuse  Social Hx: Smokes 1/2 pack/day, denies alcohol use, recreational heroin and cocaine use    PCP: Linda Renee NP    There are no other complaints, changes, or physical findings at this time. No current facility-administered medications on file prior to encounter. Current Outpatient Medications on File Prior to Encounter   Medication Sig Dispense Refill    [DISCONTINUED] omeprazole magnesium (PRILOSEC PO) Take  by mouth.  [DISCONTINUED] ibuprofen (MOTRIN) 600 mg tablet Take  by mouth two (2) times a day.  [DISCONTINUED] naproxen (NAPROSYN) 375 mg tablet Take 375 mg by mouth two (2) times daily (with meals).  [DISCONTINUED] trazodone HCl (TRAZODONE PO) Take  by mouth daily.  [DISCONTINUED] citalopram hydrobromide (CELEXA PO) Take  by mouth daily.  [DISCONTINUED] mupirocin (BACTROBAN) 2 % ointment Apply  to affected area three (3) times daily. Apply to area for 10 days 22 g 0    [DISCONTINUED] acetaminophen-codeine (TYLENOL-CODEINE #3) 300-30 mg per tablet Take 1 Tab by mouth every six (6) hours as needed for Pain. Max Daily Amount: 4 Tabs. 20 Tab 0     Past History     Past Medical History:  History reviewed. No pertinent past medical history. Past Surgical History:  Past Surgical History:   Procedure Laterality Date    HX CHOLECYSTECTOMY      HX HERNIA REPAIR      RT. HERNIA REPAIR     Family History:  Family History   Problem Relation Age of Onset    Hypertension Father     Diabetes Father     Hypertension Paternal Aunt     Cancer Maternal Grandmother     Heart Disease Maternal Grandfather     Cancer Maternal Grandfather     Hypertension Paternal Grandfather      Social History:  Social History     Tobacco Use    Smoking status: Current Every Day Smoker    Smokeless tobacco: Current User   Substance Use Topics    Alcohol use: No     Comment: LAST DRINK 10/10/18    Drug use: Yes     Types: Heroin     Allergies: Allergies   Allergen Reactions    Tramadol Other (comments)     Pt denies tramadol allergy. Review of Systems   Review of Systems   Constitutional: Positive for chills, fatigue and fever. HENT: Negative for congestion, rhinorrhea, sneezing and sore throat. Eyes: Negative for redness and visual disturbance. Respiratory: Positive for cough and shortness of breath. Cardiovascular: Positive for chest pain. Negative for leg swelling. Gastrointestinal: Negative for abdominal pain, nausea and vomiting. Genitourinary: Positive for flank pain. Negative for difficulty urinating and frequency. Musculoskeletal: Positive for myalgias. Negative for back pain and neck stiffness. Skin: Negative for rash. Neurological: Negative for dizziness, syncope, weakness and headaches. Hematological: Negative for adenopathy. All other systems reviewed and are negative.     Physical Exam   Physical Exam  Vitals and nursing note reviewed. Constitutional:       Appearance: Normal appearance. She is well-developed. HENT:      Head: Normocephalic and atraumatic. Eyes:      Conjunctiva/sclera: Conjunctivae normal.   Cardiovascular:      Rate and Rhythm: Regular rhythm. Tachycardia present. Pulses: Normal pulses. Heart sounds: Normal heart sounds, S1 normal and S2 normal. No murmur heard. Pulmonary:      Effort: Pulmonary effort is normal. No respiratory distress. Breath sounds: Examination of the right-lower field reveals decreased breath sounds. Decreased breath sounds present. No wheezing. Abdominal:      General: Bowel sounds are normal. There is no distension. Palpations: Abdomen is soft. Tenderness: There is no abdominal tenderness. There is right CVA tenderness. There is no left CVA tenderness, guarding or rebound. Musculoskeletal:         General: Normal range of motion. Cervical back: Full passive range of motion without pain, normal range of motion and neck supple. Skin:     General: Skin is warm and dry. Findings: No rash. Neurological:      Mental Status: She is alert and oriented to person, place, and time. Psychiatric:         Speech: Speech normal.         Behavior: Behavior normal.         Thought Content:  Thought content normal.         Judgment: Judgment normal.       Diagnostic Study Results   Labs -     Recent Results (from the past 12 hour(s))   URINALYSIS W/ REFLEX CULTURE    Collection Time: 09/09/21  5:28 PM    Specimen: Miscellaneous sample; Urine    Urine specimen   Result Value Ref Range    Color DARK YELLOW      Appearance CLOUDY (A) CLEAR      Specific gravity 1.025 1.003 - 1.030      pH (UA) 5.5 5.0 - 8.0      Protein 30 (A) NEG mg/dL    Glucose Negative NEG mg/dL    Ketone Negative NEG mg/dL    Blood LARGE (A) NEG      Urobilinogen 1.0 0.2 - 1.0 EU/dL    Nitrites Positive (A) NEG      Leukocyte Esterase SMALL (A) NEG      WBC 0-4 0 - 4 /hpf    RBC 0-5 0 - 5 /hpf    Epithelial cells MODERATE (A) FEW /lpf    Bacteria 2+ (A) NEG /hpf    UA:UC IF INDICATED CULTURE NOT INDICATED BY UA RESULT CNI     METABOLIC PANEL, COMPREHENSIVE    Collection Time: 09/09/21  5:28 PM   Result Value Ref Range    Sodium 134 (L) 136 - 145 mmol/L    Potassium 3.1 (L) 3.5 - 5.1 mmol/L    Chloride 99 97 - 108 mmol/L    CO2 26 21 - 32 mmol/L    Anion gap 9 5 - 15 mmol/L    Glucose 115 (H) 65 - 100 mg/dL    BUN 9 6 - 20 MG/DL    Creatinine 0.97 0.55 - 1.02 MG/DL    BUN/Creatinine ratio 9 (L) 12 - 20      GFR est AA >60 >60 ml/min/1.73m2    GFR est non-AA >60 >60 ml/min/1.73m2    Calcium 8.3 (L) 8.5 - 10.1 MG/DL    Bilirubin, total 1.0 0.2 - 1.0 MG/DL    ALT (SGPT) 20 12 - 78 U/L    AST (SGOT) 22 15 - 37 U/L    Alk. phosphatase 59 45 - 117 U/L    Protein, total 8.1 6.4 - 8.2 g/dL    Albumin 3.2 (L) 3.5 - 5.0 g/dL    Globulin 4.9 (H) 2.0 - 4.0 g/dL    A-G Ratio 0.7 (L) 1.1 - 2.2     CBC WITH AUTOMATED DIFF    Collection Time: 09/09/21  5:28 PM   Result Value Ref Range    WBC 12.9 (H) 3.6 - 11.0 K/uL    RBC 4.13 3.80 - 5.20 M/uL    HGB 10.7 (L) 11.5 - 16.0 g/dL    HCT 33.6 (L) 35.0 - 47.0 %    MCV 81.4 80.0 - 99.0 FL    MCH 25.9 (L) 26.0 - 34.0 PG    MCHC 31.8 30.0 - 36.5 g/dL    RDW 14.7 (H) 11.5 - 14.5 %    PLATELET 728 718 - 192 K/uL    MPV 9.3 8.9 - 12.9 FL    NRBC 0.0 0  WBC    ABSOLUTE NRBC 0.00 0.00 - 0.01 K/uL    NEUTROPHILS 78 (H) 32 - 75 %    LYMPHOCYTES 11 (L) 12 - 49 %    MONOCYTES 10 5 - 13 %    EOSINOPHILS 0 0 - 7 %    BASOPHILS 0 0 - 1 %    IMMATURE GRANULOCYTES 1 (H) 0.0 - 0.5 %    ABS. NEUTROPHILS 10.0 (H) 1.8 - 8.0 K/UL    ABS. LYMPHOCYTES 1.4 0.8 - 3.5 K/UL    ABS. MONOCYTES 1.3 (H) 0.0 - 1.0 K/UL    ABS. EOSINOPHILS 0.0 0.0 - 0.4 K/UL    ABS. BASOPHILS 0.0 0.0 - 0.1 K/UL    ABS. IMM.  GRANS. 0.1 (H) 0.00 - 0.04 K/UL    DF AUTOMATED     TROPONIN I    Collection Time: 09/09/21  5:28 PM   Result Value Ref Range    Troponin-I, Qt. <0.05 <0.05 ng/mL   LACTIC ACID    Collection Time: 09/09/21  5:28 PM   Result Value Ref Range    Lactic acid 1.0 0.4 - 2.0 MMOL/L   COVID-19 WITH INFLUENZA A/B    Collection Time: 09/09/21  5:28 PM   Result Value Ref Range    SARS-CoV-2 Not detected NOTD      Influenza A by PCR Not detected      Influenza B by PCR Not detected     BILIRUBIN, CONFIRM    Collection Time: 09/09/21  5:28 PM   Result Value Ref Range    Bilirubin UA, confirm Negative NEG     EKG, 12 LEAD, INITIAL    Collection Time: 09/09/21  5:43 PM   Result Value Ref Range    Ventricular Rate 87 BPM    Atrial Rate 87 BPM    P-R Interval 142 ms    QRS Duration 84 ms    Q-T Interval 354 ms    QTC Calculation (Bezet) 425 ms    Calculated P Axis 78 degrees    Calculated R Axis 76 degrees    Calculated T Axis 54 degrees    Diagnosis       Normal sinus rhythm  Normal ECG  No previous ECGs available         Radiologic Studies -   XR CHEST PORT   Final Result   Right middle lobe infiltrate. XR CHEST PORT    Result Date: 9/9/2021  Right middle lobe infiltrate. Medical Decision Making   I am the first provider for this patient. I reviewed the vital signs, available nursing notes, past medical history, past surgical history, family history and social history. Vital Signs-Reviewed the patient's vital signs. Patient Vitals for the past 24 hrs:   Temp Pulse Resp BP SpO2   09/09/21 1803  87 18 (!) 97/57 99 %   09/09/21 1624 (!) 100.5 °F (38.1 °C) (!) 108 22 119/63 96 %     Pulse Oximetry Analysis - 96% on RA (borderline)    Cardiac Monitor:   Rate: 108 bpm  Rhythm: Sinus Tachycardia     ED EKG interpretation: 17:43  Rhythm: normal sinus rhythm; and regular . Rate (approx.): 87; Axis: normal; P wave: normal; QRS interval: normal ; ST/T wave: normal; Other findings: normal. This EKG was interpreted by Sigrid Luo MD,ED Provider.     Records Reviewed: Nursing Notes and Old Medical Records    Provider Notes (Medical Decision Making):   24-year-old female presents with shortness of breath, right-sided chest and flank pain, and urinary frequency as well as fevers and chills for the past 3 days. Differential includes COVID-19, pneumonia, UTI, pyelonephritis, cholecystitis, and viral illness. ED Course:   Initial assessment performed. The patients presenting problems have been discussed, and they are in agreement with the care plan formulated and outlined with them. I have encouraged them to ask questions as they arise throughout their visit. Procedure Note- Peripheral IV Access  5:24 PM  Performed by: MD Tho Larry MD gained IV access using a 20 gauge needle because the patient had no vascular access. After cleaning the site with alcohol prep, the Right basilic vein was localized with ultrasound guidance in an anterior approach. Line confirmation was obtained by direct visualization and good blood return. No anaesthetic was used. The line was successfully flushed with normal saline and was secured with transparent tape. Estimated blood loss: 0mL  The procedure took 20 minutes, and pt tolerated well. Patient's Covid swab is negative. She has nitrates and 2+ bacteria in her urine as well as a right middle lobe pneumonia. Will treat with p.o. Levaquin to cover both of her bacterial infections and have her follow-up with outpatient primary care. Progress Note:   Updated pt on all returned results and findings. Discussed the importance of proper follow up as referred below along with return precautions. Pt in agreement with the care plan and expresses agreement with and understanding of all items discussed. Disposition:  Discharge Note:  The pt is ready for discharge. The pt's signs, symptoms, diagnosis, and discharge instructions have been discussed and pt has conveyed their understanding. The pt is to follow up as recommended or return to ER should their symptoms worsen. Plan has been discussed and pt is in agreement. PLAN:  1.    Current Discharge Medication List      START taking these medications    Details   levoFLOXacin (Levaquin) 750 mg tablet Take 1 Tablet by mouth daily for 7 days. Qty: 7 Tablet, Refills: 0  Start date: 9/9/2021, End date: 9/16/2021           2. Follow-up Information     Follow up With Specialties Details Why Contact Info    Jose Owens NP Nurse Practitioner Schedule an appointment as soon as possible for a visit   [address] [place] [address]  [phone]      [facility] EMERGENCY DEPT Emergency Medicine  As needed, If symptoms worsen [facility]  [phone]        Return to ED if worse     Diagnosis     Clinical Impression:   1. Pyelonephritis    2. Community acquired pneumonia of right middle lobe of lung    3. Hypokalemia            Please note that this dictation was completed with Dragon, computer voice recognition software. Quite often unanticipated grammatical, syntax, homophones, and other interpretive errors are inadvertently transcribed by the computer software. Please disregard these errors. Additionally, please excuse any errors that have escaped final proofreading.

## 2021-09-10 LAB
ATRIAL RATE: 87 BPM
CALCULATED P AXIS, ECG09: 78 DEGREES
CALCULATED R AXIS, ECG10: 76 DEGREES
CALCULATED T AXIS, ECG11: 54 DEGREES
DIAGNOSIS, 93000: NORMAL
P-R INTERVAL, ECG05: 142 MS
Q-T INTERVAL, ECG07: 354 MS
QRS DURATION, ECG06: 84 MS
QTC CALCULATION (BEZET), ECG08: 425 MS
VENTRICULAR RATE, ECG03: 87 BPM

## 2021-09-10 NOTE — ED NOTES
Discharge instructions were given to the patient by this RN    The patient left the Emergency Department ambulatory, alert and oriented and in no acute distress with 1  Printed prescription. The patient was encouraged to call or return to the ED for worsening issues or problems and was encouraged to schedule a follow up appointment for continuing care. The patient verbalized understanding of discharge instructions and prescriptions, all questions were answered. The patient has no further concerns at this time.

## 2021-09-12 LAB
BACTERIA SPEC CULT: ABNORMAL
CC UR VC: ABNORMAL
SERVICE CMNT-IMP: ABNORMAL

## 2021-09-12 NOTE — PROGRESS NOTES
Called pt about +blood cultures, advised she should return if no better. She is only c/o pain to the R lung but coughing up some \"muddy\" color stuff.  She does have a RML PNA

## 2021-09-13 LAB
BACTERIA SPEC CULT: ABNORMAL
SERVICE CMNT-IMP: ABNORMAL

## 2021-09-18 ENCOUNTER — HOSPITAL ENCOUNTER (EMERGENCY)
Age: 31
Discharge: LEFT BEFORE DISCHARGE | DRG: 139 | End: 2021-09-18
Attending: EMERGENCY MEDICINE
Payer: MEDICAID

## 2021-09-18 ENCOUNTER — HOSPITAL ENCOUNTER (INPATIENT)
Age: 31
LOS: 1 days | Discharge: HOME | DRG: 139 | End: 2021-09-20
Attending: EMERGENCY MEDICINE | Admitting: FAMILY MEDICINE
Payer: MEDICAID

## 2021-09-18 VITALS
SYSTOLIC BLOOD PRESSURE: 115 MMHG | DIASTOLIC BLOOD PRESSURE: 57 MMHG | RESPIRATION RATE: 20 BRPM | TEMPERATURE: 101.8 F | HEART RATE: 84 BPM | OXYGEN SATURATION: 95 % | HEIGHT: 64 IN | BODY MASS INDEX: 31.41 KG/M2 | WEIGHT: 184 LBS

## 2021-09-18 DIAGNOSIS — D64.9 ANEMIA, UNSPECIFIED TYPE: ICD-10-CM

## 2021-09-18 DIAGNOSIS — R05.9 COUGH: Primary | ICD-10-CM

## 2021-09-18 DIAGNOSIS — R50.9 FEVER, UNSPECIFIED FEVER CAUSE: Primary | ICD-10-CM

## 2021-09-18 DIAGNOSIS — E87.6 HYPOKALEMIA: ICD-10-CM

## 2021-09-18 DIAGNOSIS — N12 PYELONEPHRITIS: ICD-10-CM

## 2021-09-18 DIAGNOSIS — J18.9 PNEUMONIA OF RIGHT LOWER LOBE DUE TO INFECTIOUS ORGANISM: ICD-10-CM

## 2021-09-18 LAB
FLUAV RNA SPEC QL NAA+PROBE: NOT DETECTED
FLUBV RNA SPEC QL NAA+PROBE: NOT DETECTED
HCG UR QL: NEGATIVE
SARS-COV-2, COV2: NOT DETECTED

## 2021-09-18 PROCEDURE — 99283 EMERGENCY DEPT VISIT LOW MDM: CPT

## 2021-09-18 PROCEDURE — 81025 URINE PREGNANCY TEST: CPT

## 2021-09-18 PROCEDURE — 87636 SARSCOV2 & INF A&B AMP PRB: CPT

## 2021-09-18 RX ORDER — SODIUM CHLORIDE 0.9 % (FLUSH) 0.9 %
5-40 SYRINGE (ML) INJECTION EVERY 8 HOURS
Status: DISCONTINUED | OUTPATIENT
Start: 2021-09-18 | End: 2021-09-18 | Stop reason: HOSPADM

## 2021-09-18 RX ORDER — SODIUM CHLORIDE 0.9 % (FLUSH) 0.9 %
5-40 SYRINGE (ML) INJECTION AS NEEDED
Status: DISCONTINUED | OUTPATIENT
Start: 2021-09-18 | End: 2021-09-18 | Stop reason: HOSPADM

## 2021-09-18 RX ORDER — ACETAMINOPHEN 500 MG
1000 TABLET ORAL
Status: DISCONTINUED | OUTPATIENT
Start: 2021-09-18 | End: 2021-09-18 | Stop reason: HOSPADM

## 2021-09-19 ENCOUNTER — APPOINTMENT (OUTPATIENT)
Dept: GENERAL RADIOLOGY | Age: 31
DRG: 139 | End: 2021-09-19
Attending: EMERGENCY MEDICINE
Payer: MEDICAID

## 2021-09-19 PROBLEM — J18.9 PNEUMONIA: Status: ACTIVE | Noted: 2021-09-19

## 2021-09-19 LAB
ALBUMIN SERPL-MCNC: 2.1 G/DL (ref 3.5–5)
ALBUMIN/GLOB SERPL: 0.4 {RATIO} (ref 1.1–2.2)
ALP SERPL-CCNC: 55 U/L (ref 45–117)
ALT SERPL-CCNC: 18 U/L (ref 12–78)
ANION GAP SERPL CALC-SCNC: 6 MMOL/L (ref 5–15)
AST SERPL-CCNC: 20 U/L (ref 15–37)
BASOPHILS # BLD: 0 K/UL (ref 0–0.1)
BASOPHILS NFR BLD: 0 % (ref 0–1)
BILIRUB SERPL-MCNC: 0.5 MG/DL (ref 0.2–1)
BNP SERPL-MCNC: 312 PG/ML (ref 0–125)
BUN SERPL-MCNC: 6 MG/DL (ref 6–20)
BUN/CREAT SERPL: 7 (ref 12–20)
CALCIUM SERPL-MCNC: 8 MG/DL (ref 8.5–10.1)
CHLORIDE SERPL-SCNC: 97 MMOL/L (ref 97–108)
CO2 SERPL-SCNC: 30 MMOL/L (ref 21–32)
CREAT SERPL-MCNC: 0.86 MG/DL (ref 0.55–1.02)
DIFFERENTIAL METHOD BLD: ABNORMAL
EOSINOPHIL # BLD: 0.1 K/UL (ref 0–0.4)
EOSINOPHIL NFR BLD: 2 % (ref 0–7)
ERYTHROCYTE [DISTWIDTH] IN BLOOD BY AUTOMATED COUNT: 14.6 % (ref 11.5–14.5)
GLOBULIN SER CALC-MCNC: 5 G/DL (ref 2–4)
GLUCOSE SERPL-MCNC: 104 MG/DL (ref 65–100)
HCT VFR BLD AUTO: 26.3 % (ref 35–47)
HGB BLD-MCNC: 8.6 G/DL (ref 11.5–16)
IMM GRANULOCYTES # BLD AUTO: 0.1 K/UL (ref 0–0.04)
IMM GRANULOCYTES NFR BLD AUTO: 1 % (ref 0–0.5)
LACTATE SERPL-SCNC: 0.5 MMOL/L (ref 0.4–2)
LYMPHOCYTES # BLD: 2.1 K/UL (ref 0.8–3.5)
LYMPHOCYTES NFR BLD: 23 % (ref 12–49)
MCH RBC QN AUTO: 26 PG (ref 26–34)
MCHC RBC AUTO-ENTMCNC: 32.7 G/DL (ref 30–36.5)
MCV RBC AUTO: 79.5 FL (ref 80–99)
MONOCYTES # BLD: 0.6 K/UL (ref 0–1)
MONOCYTES NFR BLD: 6 % (ref 5–13)
NEUTS SEG # BLD: 6.1 K/UL (ref 1.8–8)
NEUTS SEG NFR BLD: 68 % (ref 32–75)
NRBC # BLD: 0 K/UL (ref 0–0.01)
NRBC BLD-RTO: 0 PER 100 WBC
PLATELET # BLD AUTO: 364 K/UL (ref 150–400)
PMV BLD AUTO: 9.2 FL (ref 8.9–12.9)
POTASSIUM SERPL-SCNC: 2.5 MMOL/L (ref 3.5–5.1)
PROT SERPL-MCNC: 7.1 G/DL (ref 6.4–8.2)
RBC # BLD AUTO: 3.31 M/UL (ref 3.8–5.2)
SODIUM SERPL-SCNC: 133 MMOL/L (ref 136–145)
TROPONIN I SERPL-MCNC: <0.05 NG/ML
WBC # BLD AUTO: 8.9 K/UL (ref 3.6–11)

## 2021-09-19 PROCEDURE — 87040 BLOOD CULTURE FOR BACTERIA: CPT

## 2021-09-19 PROCEDURE — 65270000032 HC RM SEMIPRIVATE

## 2021-09-19 PROCEDURE — 87205 SMEAR GRAM STAIN: CPT

## 2021-09-19 PROCEDURE — 84484 ASSAY OF TROPONIN QUANT: CPT

## 2021-09-19 PROCEDURE — 74011250636 HC RX REV CODE- 250/636: Performed by: FAMILY MEDICINE

## 2021-09-19 PROCEDURE — 74011250637 HC RX REV CODE- 250/637: Performed by: STUDENT IN AN ORGANIZED HEALTH CARE EDUCATION/TRAINING PROGRAM

## 2021-09-19 PROCEDURE — 36415 COLL VENOUS BLD VENIPUNCTURE: CPT

## 2021-09-19 PROCEDURE — 94760 N-INVAS EAR/PLS OXIMETRY 1: CPT

## 2021-09-19 PROCEDURE — 74011000258 HC RX REV CODE- 258: Performed by: FAMILY MEDICINE

## 2021-09-19 PROCEDURE — 93005 ELECTROCARDIOGRAM TRACING: CPT

## 2021-09-19 PROCEDURE — 74011250637 HC RX REV CODE- 250/637: Performed by: FAMILY MEDICINE

## 2021-09-19 PROCEDURE — 83880 ASSAY OF NATRIURETIC PEPTIDE: CPT

## 2021-09-19 PROCEDURE — 71045 X-RAY EXAM CHEST 1 VIEW: CPT

## 2021-09-19 PROCEDURE — 74011000258 HC RX REV CODE- 258: Performed by: STUDENT IN AN ORGANIZED HEALTH CARE EDUCATION/TRAINING PROGRAM

## 2021-09-19 PROCEDURE — 74011250636 HC RX REV CODE- 250/636: Performed by: STUDENT IN AN ORGANIZED HEALTH CARE EDUCATION/TRAINING PROGRAM

## 2021-09-19 PROCEDURE — 94761 N-INVAS EAR/PLS OXIMETRY MLT: CPT

## 2021-09-19 PROCEDURE — 85025 COMPLETE CBC W/AUTO DIFF WBC: CPT

## 2021-09-19 PROCEDURE — 80053 COMPREHEN METABOLIC PANEL: CPT

## 2021-09-19 PROCEDURE — 36600 WITHDRAWAL OF ARTERIAL BLOOD: CPT

## 2021-09-19 PROCEDURE — 83605 ASSAY OF LACTIC ACID: CPT

## 2021-09-19 RX ORDER — LANOLIN ALCOHOL/MO/W.PET/CERES
100 CREAM (GRAM) TOPICAL DAILY
Status: DISCONTINUED | OUTPATIENT
Start: 2021-09-20 | End: 2021-09-20 | Stop reason: HOSPADM

## 2021-09-19 RX ORDER — ACETAMINOPHEN 325 MG/1
650 TABLET ORAL
Status: DISCONTINUED | OUTPATIENT
Start: 2021-09-19 | End: 2021-09-20 | Stop reason: HOSPADM

## 2021-09-19 RX ORDER — CLONIDINE HYDROCHLORIDE 0.1 MG/1
0.1 TABLET ORAL
Status: DISCONTINUED | OUTPATIENT
Start: 2021-09-19 | End: 2021-09-20 | Stop reason: HOSPADM

## 2021-09-19 RX ORDER — ASCORBIC ACID 500 MG
1000 TABLET ORAL DAILY
Status: DISCONTINUED | OUTPATIENT
Start: 2021-09-19 | End: 2021-09-19

## 2021-09-19 RX ORDER — ZINC SULFATE 50(220)MG
1 CAPSULE ORAL DAILY
Status: DISCONTINUED | OUTPATIENT
Start: 2021-09-19 | End: 2021-09-19

## 2021-09-19 RX ORDER — ENOXAPARIN SODIUM 100 MG/ML
40 INJECTION SUBCUTANEOUS EVERY 24 HOURS
Status: DISCONTINUED | OUTPATIENT
Start: 2021-09-19 | End: 2021-09-20 | Stop reason: HOSPADM

## 2021-09-19 RX ORDER — BENZONATATE 100 MG/1
100 CAPSULE ORAL
Status: DISCONTINUED | OUTPATIENT
Start: 2021-09-19 | End: 2021-09-19

## 2021-09-19 RX ORDER — LEVOFLOXACIN 5 MG/ML
750 INJECTION, SOLUTION INTRAVENOUS EVERY 24 HOURS
Status: DISCONTINUED | OUTPATIENT
Start: 2021-09-19 | End: 2021-09-19

## 2021-09-19 RX ORDER — LEVOFLOXACIN 5 MG/ML
750 INJECTION, SOLUTION INTRAVENOUS
Status: DISCONTINUED | OUTPATIENT
Start: 2021-09-19 | End: 2021-09-19

## 2021-09-19 RX ORDER — SODIUM CHLORIDE 0.9 % (FLUSH) 0.9 %
5-40 SYRINGE (ML) INJECTION EVERY 8 HOURS
Status: DISCONTINUED | OUTPATIENT
Start: 2021-09-19 | End: 2021-09-20 | Stop reason: HOSPADM

## 2021-09-19 RX ORDER — POTASSIUM CHLORIDE 750 MG/1
40 TABLET, FILM COATED, EXTENDED RELEASE ORAL
Status: DISCONTINUED | OUTPATIENT
Start: 2021-09-19 | End: 2021-09-19

## 2021-09-19 RX ORDER — POTASSIUM CHLORIDE 7.45 MG/ML
10 INJECTION INTRAVENOUS
Status: DISCONTINUED | OUTPATIENT
Start: 2021-09-19 | End: 2021-09-19

## 2021-09-19 RX ORDER — LOPERAMIDE HYDROCHLORIDE 2 MG/1
2 CAPSULE ORAL AS NEEDED
Status: DISCONTINUED | OUTPATIENT
Start: 2021-09-19 | End: 2021-09-20 | Stop reason: HOSPADM

## 2021-09-19 RX ORDER — FOLIC ACID 1 MG/1
1 TABLET ORAL DAILY
Status: DISCONTINUED | OUTPATIENT
Start: 2021-09-20 | End: 2021-09-20 | Stop reason: HOSPADM

## 2021-09-19 RX ORDER — CODEINE PHOSPHATE AND GUAIFENESIN 10; 100 MG/5ML; MG/5ML
5 SOLUTION ORAL
Status: DISCONTINUED | OUTPATIENT
Start: 2021-09-19 | End: 2021-09-20 | Stop reason: HOSPADM

## 2021-09-19 RX ORDER — POLYETHYLENE GLYCOL 3350 17 G/17G
17 POWDER, FOR SOLUTION ORAL DAILY PRN
Status: DISCONTINUED | OUTPATIENT
Start: 2021-09-19 | End: 2021-09-20 | Stop reason: HOSPADM

## 2021-09-19 RX ORDER — SODIUM CHLORIDE 0.9 % (FLUSH) 0.9 %
5-40 SYRINGE (ML) INJECTION AS NEEDED
Status: DISCONTINUED | OUTPATIENT
Start: 2021-09-19 | End: 2021-09-20 | Stop reason: HOSPADM

## 2021-09-19 RX ORDER — MAGNESIUM SULFATE HEPTAHYDRATE 40 MG/ML
2 INJECTION, SOLUTION INTRAVENOUS ONCE
Status: ACTIVE | OUTPATIENT
Start: 2021-09-19 | End: 2021-09-19

## 2021-09-19 RX ORDER — SODIUM CHLORIDE 0.9 % (FLUSH) 0.9 %
5-40 SYRINGE (ML) INJECTION EVERY 8 HOURS
Status: DISCONTINUED | OUTPATIENT
Start: 2021-09-19 | End: 2021-09-19 | Stop reason: SDUPTHER

## 2021-09-19 RX ORDER — GUAIFENESIN 600 MG/1
600 TABLET, EXTENDED RELEASE ORAL EVERY 12 HOURS
Status: DISCONTINUED | OUTPATIENT
Start: 2021-09-19 | End: 2021-09-20 | Stop reason: HOSPADM

## 2021-09-19 RX ORDER — ACETAMINOPHEN 650 MG/1
650 SUPPOSITORY RECTAL
Status: DISCONTINUED | OUTPATIENT
Start: 2021-09-19 | End: 2021-09-20 | Stop reason: HOSPADM

## 2021-09-19 RX ORDER — ENOXAPARIN SODIUM 100 MG/ML
40 INJECTION SUBCUTANEOUS EVERY 24 HOURS
Status: DISCONTINUED | OUTPATIENT
Start: 2021-09-19 | End: 2021-09-19

## 2021-09-19 RX ORDER — ONDANSETRON 4 MG/1
4 TABLET, ORALLY DISINTEGRATING ORAL
Status: DISCONTINUED | OUTPATIENT
Start: 2021-09-19 | End: 2021-09-20 | Stop reason: HOSPADM

## 2021-09-19 RX ORDER — DICYCLOMINE HYDROCHLORIDE 10 MG/ML
20 INJECTION INTRAMUSCULAR
Status: DISCONTINUED | OUTPATIENT
Start: 2021-09-19 | End: 2021-09-20 | Stop reason: HOSPADM

## 2021-09-19 RX ORDER — SODIUM CHLORIDE 9 MG/ML
150 INJECTION, SOLUTION INTRAVENOUS CONTINUOUS
Status: DISCONTINUED | OUTPATIENT
Start: 2021-09-19 | End: 2021-09-20 | Stop reason: HOSPADM

## 2021-09-19 RX ORDER — SODIUM CHLORIDE 0.9 % (FLUSH) 0.9 %
5-40 SYRINGE (ML) INJECTION AS NEEDED
Status: DISCONTINUED | OUTPATIENT
Start: 2021-09-19 | End: 2021-09-19 | Stop reason: SDUPTHER

## 2021-09-19 RX ORDER — THERA TABS 400 MCG
1 TAB ORAL DAILY
Status: DISCONTINUED | OUTPATIENT
Start: 2021-09-20 | End: 2021-09-20 | Stop reason: HOSPADM

## 2021-09-19 RX ORDER — BENZONATATE 100 MG/1
100 CAPSULE ORAL 3 TIMES DAILY
Status: DISCONTINUED | OUTPATIENT
Start: 2021-09-19 | End: 2021-09-20 | Stop reason: HOSPADM

## 2021-09-19 RX ORDER — ONDANSETRON 2 MG/ML
4 INJECTION INTRAMUSCULAR; INTRAVENOUS
Status: DISCONTINUED | OUTPATIENT
Start: 2021-09-19 | End: 2021-09-20 | Stop reason: HOSPADM

## 2021-09-19 RX ADMIN — SODIUM CHLORIDE 150 ML/HR: 900 INJECTION, SOLUTION INTRAVENOUS at 05:16

## 2021-09-19 RX ADMIN — Medication 1 CAPSULE: at 08:12

## 2021-09-19 RX ADMIN — BENZONATATE 100 MG: 100 CAPSULE ORAL at 08:12

## 2021-09-19 RX ADMIN — SODIUM CHLORIDE 150 ML/HR: 900 INJECTION, SOLUTION INTRAVENOUS at 14:45

## 2021-09-19 RX ADMIN — BENZONATATE 100 MG: 100 CAPSULE ORAL at 18:38

## 2021-09-19 RX ADMIN — VANCOMYCIN HYDROCHLORIDE 1000 MG: 1 INJECTION, POWDER, FOR SOLUTION INTRAVENOUS at 08:13

## 2021-09-19 RX ADMIN — POTASSIUM BICARBONATE 50 MEQ: 977.5 TABLET, EFFERVESCENT ORAL at 08:11

## 2021-09-19 RX ADMIN — GUAIFENESIN 600 MG: 600 TABLET, EXTENDED RELEASE ORAL at 08:12

## 2021-09-19 RX ADMIN — Medication 10 ML: at 21:23

## 2021-09-19 RX ADMIN — BENZONATATE 100 MG: 100 CAPSULE ORAL at 11:02

## 2021-09-19 RX ADMIN — ENOXAPARIN SODIUM 40 MG: 100 INJECTION SUBCUTANEOUS at 08:24

## 2021-09-19 RX ADMIN — CEFEPIME 2 G: 2 INJECTION, POWDER, FOR SOLUTION INTRAVENOUS at 11:02

## 2021-09-19 RX ADMIN — BENZONATATE 100 MG: 100 CAPSULE ORAL at 21:23

## 2021-09-19 RX ADMIN — PIPERACILLIN AND TAZOBACTAM 3.38 G: 3; .375 INJECTION, POWDER, FOR SOLUTION INTRAVENOUS at 05:20

## 2021-09-19 RX ADMIN — POTASSIUM BICARBONATE 50 MEQ: 977.5 TABLET, EFFERVESCENT ORAL at 11:01

## 2021-09-19 RX ADMIN — VANCOMYCIN HYDROCHLORIDE 1000 MG: 1 INJECTION, POWDER, FOR SOLUTION INTRAVENOUS at 08:24

## 2021-09-19 RX ADMIN — VANCOMYCIN HYDROCHLORIDE 1250 MG: 10 INJECTION, POWDER, LYOPHILIZED, FOR SOLUTION INTRAVENOUS at 21:22

## 2021-09-19 RX ADMIN — OXYCODONE HYDROCHLORIDE AND ACETAMINOPHEN 1000 MG: 500 TABLET ORAL at 08:12

## 2021-09-19 RX ADMIN — POTASSIUM BICARBONATE 50 MEQ: 977.5 TABLET, EFFERVESCENT ORAL at 12:00

## 2021-09-19 RX ADMIN — SODIUM CHLORIDE, POTASSIUM CHLORIDE, SODIUM LACTATE AND CALCIUM CHLORIDE 1000 ML: 600; 310; 30; 20 INJECTION, SOLUTION INTRAVENOUS at 04:03

## 2021-09-19 RX ADMIN — GUAIFENESIN 600 MG: 600 TABLET, EXTENDED RELEASE ORAL at 21:23

## 2021-09-19 RX ADMIN — LEVOFLOXACIN 750 MG: 5 INJECTION, SOLUTION INTRAVENOUS at 05:17

## 2021-09-19 RX ADMIN — BENZONATATE 100 MG: 100 CAPSULE ORAL at 06:08

## 2021-09-19 RX ADMIN — CEFEPIME 2 G: 2 INJECTION, POWDER, FOR SOLUTION INTRAVENOUS at 18:27

## 2021-09-19 RX ADMIN — Medication 10 ML: at 14:29

## 2021-09-19 NOTE — H&P
Hospitalist Admission Note    NAME: Isaak Samaniego   :  1990   MRN:  [id number]   Room Number: 325/91  @ [facility]     Date/Time:  2021 1:06 PM    Patient PCP: Yolanda Vazquez NP  ______________________________________________________________________  Given the patient's current clinical presentation, I have a high level of concern for decompensation if discharged from the emergency department. Complex decision making was performed, which includes reviewing the patient's available past medical records, laboratory results, and x-ray films. My assessment of this patient's clinical condition and my plan of care is as follows. Assessment / Plan: Active Problems:    Pneumonia (2021)      Pneumonia POA - failed outpatient treatment. Fever POA   Failed outpatient levofloxacin therapy. T-max 101.8. Chest x-ray independently interpreted reveals right lower lobe pneumonia. Lactic acid within normal limits. Covid, influenza test negative. Vancomycin, cefepime  Check respiratory culture, blood culture  Trend temperature and WBC curve  Antitussives      Hypokalemia POA  EKG interpreted independently - normal sinus rhythm, QTc 432 ms  - Repleted orally. - Recheck potassium levels  - Telemetry     Heroin abuse POA  Cocaine abuse POA  Tobacco dependence POA  - Check UDS   - Patient was counseled extensively on the need to abstain from drugs its addictive tendencies, its deleterious effects on the brain, cardiovascular system, lungs as well as its financial & social sequelae  - COWS monitoring  - nicotine patch declined  - Patient was counseled extensively on the need to abstain from tobacco, its addictive tendencies, its deleterious effects on the lungs, cardiovascular  as well as its financial & social sequelae        Microcytic anemia POA  Baseline Hgb ~ 10. Current Hgb 8.6.  - check anemia panel        Body mass index is 32.1 kg/m².   Code Status: full   Surrogate Decision Maker:  Name:     Rel:  Diana Gabriel  Father Home Ph:  Work Ph:  Mobile Ph: [phone]            DVT Prophylaxis: Lovenox  GI Prophylaxis: not indicated  Baseline: ambulatory independently         Subjective:   CHIEF COMPLAINT: Shortness of breath, cough    HISTORY OF PRESENT ILLNESS:     Bety Duckworth is a 31 y.o.  female with PMH of pneumonia presents to ED with c/o productive cough, shortness of breath. Patient reports progressively worsening shortness of breath, subjective fevers, cough productive of dark brown mucus, fatigue, right-sided chest pain for the past 13 days. She was seen in the ER on 9/9 and was prescribed a 7-day course of Levaquin which she has completed but reports that it did not affect her symptoms. Pain in chest is sharp worsened by coughing and taking deep breaths. Reports waxing waning fevers and chills. Seen in the ER yesterday left AMA and returned within 12 hours of leaving. We were asked to admit for work up and evaluation of the above problems. Past Medical History:   Diagnosis Date    IVDU (intravenous drug user)         Past Surgical History:   Procedure Laterality Date    HX CHOLECYSTECTOMY      HX HERNIA REPAIR      RT. HERNIA REPAIR       Social History     Tobacco Use    Smoking status: Current Every Day Smoker    Smokeless tobacco: Current User   Substance Use Topics    Alcohol use: Not Currently     Comment: LAST DRINK 10/10/18        Family History   Problem Relation Age of Onset    Hypertension Father     Diabetes Father     Hypertension Paternal Aunt     Cancer Maternal Grandmother     Heart Disease Maternal Grandfather     Cancer Maternal Grandfather     Hypertension Paternal Grandfather      No Known Allergies     Prior to Admission medications    Not on File       REVIEW OF SYSTEMS:     I am not able to complete the review of systems because:    The patient is intubated and sedated    The patient has altered mental status due to his acute medical problems    The patient has baseline aphasia from prior stroke(s)    The patient has baseline dementia and is not reliable historian    The patient is in acute medical distress and unable to provide information           Total of 12 systems reviewed as follows:       POSITIVE= underlined text  Negative = text not underlined  General:  fever, chills, sweats, generalized weakness, weight loss/gain,      loss of appetite   Eyes:    blurred vision, eye pain, loss of vision, double vision  ENT:    rhinorrhea, pharyngitis   Respiratory:   cough, sputum production, SOB, [place], wheezing, pleuritic pain   Cardiology:   chest pain, palpitations, orthopnea, PND, edema, syncope   Gastrointestinal:  abdominal pain , N/V, diarrhea, dysphagia, constipation, bleeding   Genitourinary:  frequency, urgency, dysuria, hematuria, incontinence   Muskuloskeletal :  arthralgia, myalgia, back pain  Hematology:  easy bruising, nose or gum bleeding, lymphadenopathy   Dermatological: rash, ulceration, pruritis, color change / jaundice  Endocrine:   hot flashes or polydipsia   Neurological:  headache, dizziness, confusion, focal weakness, paresthesia,     Speech difficulties, memory loss, gait difficulty  Psychological: Feelings of anxiety, depression, agitation    Objective:   VITALS:    Visit Vitals  BP (!) 107/55 (BP 1 Location: Left arm, BP Patient Position: At rest;Semi fowlers)   Pulse 84   Temp 99.4 °F (37.4 °C)   Resp 16   Ht 5' 4\" (1.626 m)   Wt 84.8 kg (187 lb)   SpO2 95%   BMI 32.10 kg/m²       PHYSICAL EXAM:    General:    Alert, cooperative, no distress, appears stated age. HEENT: Atraumatic, anicteric sclerae, pink conjunctivae     No oral ulcers, mucosa moist, throat clear, dentition fair  Neck:  Supple, symmetrical,  thyroid: non tender  Lungs:   Crackles RLL  Chest wall:  No tenderness  No Accessory muscle use.   Heart:   Regular  rhythm,  No  murmur   No edema  Abdomen:   Soft, non-tender. Not distended. Bowel sounds normal  Extremities: No cyanosis. No clubbing,      Skin turgor normal, Capillary refill normal, Radial dial pulse 2+  Skin:     Not pale. Not Jaundiced  No rashes   Psych:  Good insight. Not depressed. Not anxious or agitated. Neurologic: EOMs intact. No facial asymmetry. No aphasia or slurred speech. Symmetrical strength, Sensation grossly intact. Alert and oriented X 4.     ______________________________________________________________________    Care Plan discussed with:  Patient/Family, Nurse and     Expected  Disposition:  Home w/Family  ________________________________________________________________________  TOTAL TIME:  39 Minutes    Critical Care Provided     Minutes non procedure based      Comments    x Reviewed previous records   >50% of visit spent in counseling and coordination of care x Discussion with patient and/or family and questions answered       ________________________________________________________________________  Signed: Godwin Osullivan MD    Procedures: see electronic medical records for all procedures/Xrays and details which were not copied into this note but were reviewed prior to creation of Plan.     LAB DATA REVIEWED:    Recent Results (from the past 24 hour(s))   HCG URINE, QL. - POC    Collection Time: 09/18/21  9:56 PM   Result Value Ref Range    Pregnancy test,urine (POC) Negative NEG     COVID-19 WITH INFLUENZA A/B    Collection Time: 09/18/21  9:57 PM   Result Value Ref Range    SARS-CoV-2 Not detected NOTD      Influenza A by PCR Not detected      Influenza B by PCR Not detected     CBC WITH AUTOMATED DIFF    Collection Time: 09/19/21 12:05 AM   Result Value Ref Range    WBC 8.9 3.6 - 11.0 K/uL    RBC 3.31 (L) 3.80 - 5.20 M/uL    HGB 8.6 (L) 11.5 - 16.0 g/dL    HCT 26.3 (L) 35.0 - 47.0 %    MCV 79.5 (L) 80.0 - 99.0 FL    MCH 26.0 26.0 - 34.0 PG    MCHC 32.7 30.0 - 36.5 g/dL    RDW 14.6 (H) 11.5 - 14.5 %    PLATELET 061 150 - 400 K/uL    MPV 9.2 8.9 - 12.9 FL    NRBC 0.0 0  WBC    ABSOLUTE NRBC 0.00 0.00 - 0.01 K/uL    NEUTROPHILS 68 32 - 75 %    LYMPHOCYTES 23 12 - 49 %    MONOCYTES 6 5 - 13 %    EOSINOPHILS 2 0 - 7 %    BASOPHILS 0 0 - 1 %    IMMATURE GRANULOCYTES 1 (H) 0.0 - 0.5 %    ABS. NEUTROPHILS 6.1 1.8 - 8.0 K/UL    ABS. LYMPHOCYTES 2.1 0.8 - 3.5 K/UL    ABS. MONOCYTES 0.6 0.0 - 1.0 K/UL    ABS. EOSINOPHILS 0.1 0.0 - 0.4 K/UL    ABS. BASOPHILS 0.0 0.0 - 0.1 K/UL    ABS. IMM. GRANS. 0.1 (H) 0.00 - 0.04 K/UL    DF AUTOMATED     METABOLIC PANEL, COMPREHENSIVE    Collection Time: 09/19/21 12:05 AM   Result Value Ref Range    Sodium 133 (L) 136 - 145 mmol/L    Potassium 2.5 (LL) 3.5 - 5.1 mmol/L    Chloride 97 97 - 108 mmol/L    CO2 30 21 - 32 mmol/L    Anion gap 6 5 - 15 mmol/L    Glucose 104 (H) 65 - 100 mg/dL    BUN 6 6 - 20 MG/DL    Creatinine 0.86 0.55 - 1.02 MG/DL    BUN/Creatinine ratio 7 (L) 12 - 20      GFR est AA >60 >60 ml/min/1.73m2    GFR est non-AA >60 >60 ml/min/1.73m2    Calcium 8.0 (L) 8.5 - 10.1 MG/DL    Bilirubin, total 0.5 0.2 - 1.0 MG/DL    ALT (SGPT) 18 12 - 78 U/L    AST (SGOT) 20 15 - 37 U/L    Alk.  phosphatase 55 45 - 117 U/L    Protein, total 7.1 6.4 - 8.2 g/dL    Albumin 2.1 (L) 3.5 - 5.0 g/dL    Globulin 5.0 (H) 2.0 - 4.0 g/dL    A-G Ratio 0.4 (L) 1.1 - 2.2     TROPONIN I    Collection Time: 09/19/21 12:05 AM   Result Value Ref Range    Troponin-I, Qt. <0.05 <0.05 ng/mL   NT-PRO BNP    Collection Time: 09/19/21 12:05 AM   Result Value Ref Range    NT pro- (H) 0 - 125 PG/ML   LACTIC ACID    Collection Time: 09/19/21 12:05 AM   Result Value Ref Range    Lactic acid 0.5 0.4 - 2.0 MMOL/L   EKG, 12 LEAD, INITIAL    Collection Time: 09/19/21 10:35 AM   Result Value Ref Range    Ventricular Rate 83 BPM    Atrial Rate 83 BPM    P-R Interval 148 ms    QRS Duration 82 ms    Q-T Interval 368 ms    QTC Calculation (Bezet) 432 ms    Calculated P Axis 71 degrees    Calculated R Axis 61 degrees Calculated T Axis 58 degrees    Diagnosis       Normal sinus rhythm  Normal ECG  When compared with ECG of 09-SEP-2021 17:43,  No significant change was found

## 2021-09-19 NOTE — PROGRESS NOTES
Chart accessed to assist primary RN. New fluid bag hung @ 150. ,DirectAddress_Unknown,DirectAddress_Unknown,DirectAddress_Unknown

## 2021-09-19 NOTE — ED NOTES
CHECKED ON PT IN BATHROOM. INSTRUCTED PT THAT SHE NEEDS TO COME OUT OF BATHROOM SO WE CAN TAKE CARE OF HER. \"I'M COMING OUT RIGHT NOW. \"     PT AMBULATORY BACK TO ROOM WITH SHUFFLING GATE. APPEARS TO BE IMPAIRED. PT HANDS NOTED TO BE DIRTY.

## 2021-09-19 NOTE — PROGRESS NOTES
Bedside report received from [facility], pt resting in bed comfortable at this time. No pain or distress noted. 0930-pt has visitor present in room, pt anxious, coughing excessively, viscous brown mucus noted. Pt     1144-pt resting comfortable    1250-pt resting comfortable, stating \"let me sleep\"    1420-IV access flushed    1533-pt comfortable    1730-pt resting comfortable, no pain or distress noted. Bedside and Verbal shift change report given to Christy dodson (oncoming nurse) by Denis Oconnor (offgoing nurse). Report included the following information SBAR, Intake/Output, MAR, Recent Results and Med Rec Status.

## 2021-09-19 NOTE — ED TRIAGE NOTES
Patient here today stating having cough, worse than last week being diagnosed from pneumonia. Patient very sleepy in triage chair, almost fell out of chair. States \"the abx makes me sleepy\".  Admits to [facility] use prior to arrival.

## 2021-09-19 NOTE — PROGRESS NOTES
[facility] Dosing Services: Vancomycin Daily Dosing Note    Consult for dosing of vancomycin by Dr. Reid Larson  Indication: CAP  Day of Therapy: 1    Ht Readings from Last 1 Encounters:   09/18/21 162.6 cm (64\")     Wt Readings from Last 1 Encounters:   09/18/21 84.8 kg (187 lb)       Vancomycin:    1. Load with 2000 mg IV x 1 then Current maintenance dose: vancomycin 1250 mg IV every 12 hours   2. Last level n/a mcg/mL (new start)  3. Dose calculated to approximate a   a. Target AUC/RADHA of 400-600  b. Trough of 15.8 mcg/mL     4. Plan: obtain random vanc level in AM @ 09:30  5. Dose administration notes:  loading dose 2000 mg x 1 given @ 08:14 on 9/19    Date Dose & Interval Measured level AUC/RADHA and Extrapolated trough (mcg/mL)                             Other Antimicrobial   (not dosed by pharmacist) Cefepime 2 g IV q8h  (s/p zosyn 3.375 g x 1)  (s/p levofloxacin 750 mg IV x 1)   Cultures 9/19 blood cx - pending  9/19 paired blood cx - pending  9/19 sputum - pending  9/9 urine cx - >100K enterobacter aerogenes   Serum Creatinine Lab Results   Component Value Date/Time    Creatinine 0.86 09/19/2021 12:05 AM       Creatinine Clearance Estimated Creatinine Clearance: 102.5 mL/min (based on SCr of 0.86 mg/dL). Temp Temp: 97.3 °F (36.3 °C)     WBC Lab Results   Component Value Date/Time    WBC 8.9 09/19/2021 12:05 AM      Procalcitonin  No results found for: PCT     Pharmacy will follow the patient on a daily basis and make adjustments based on patient's clinical status.     Thank you,     Fili Smith

## 2021-09-19 NOTE — ED PROVIDER NOTES
EMERGENCY DEPARTMENT HISTORY AND PHYSICAL EXAM    Date: 9/18/2021  Patient Name: Esa Garay    History of Presenting Illness     Chief Complaint   Patient presents with    Cough         History Provided By: Patient    Chief Complaint: cough  Duration: 8 days   Timing:  Acute  Quality: productive brown mucous  Severity: Severe  Modifying Factors: none  Associated Symptoms: denies any other associated signs or symptoms      HPI: Esa Garay is a 31 y.o. female with a PMH of hernia repair pneumonia pyelonephritis who presents with cough productive of dark brown mucus for the past 8 days. Patient states she was in his hospital last week and had pneumonia. Patient states she took [address] Difficult to elicit history from patient, patient is slow to respond. When asked if patient used any drugs she stated she did snort heroin earlier today. Patient also reports swelling of her ankles. Patient alert but somnolent and falls asleep when asked questions but arouses easily. Patient continually asking for her boyfriend to be with her in her room. PCP: Zia Bennett NP        Past History     Past Medical History:  No past medical history on file. Past Surgical History:  Past Surgical History:   Procedure Laterality Date    HX CHOLECYSTECTOMY      HX HERNIA REPAIR      RT. HERNIA REPAIR       Family History:  Family History   Problem Relation Age of Onset    Hypertension Father     Diabetes Father     Hypertension Paternal Aunt     Cancer Maternal Grandmother     Heart Disease Maternal Grandfather     Cancer Maternal Grandfather     Hypertension Paternal Grandfather        Social History:  Social History     Tobacco Use    Smoking status: Current Every Day Smoker    Smokeless tobacco: Current User   Substance Use Topics    Alcohol use: No     Comment: LAST DRINK 10/10/18    Drug use: Yes     Types: Heroin       Allergies:   Allergies   Allergen Reactions    Tramadol Other (comments) Pt denies tramadol allergy. Review of Systems   Review of Systems   Constitutional: Positive for fever. HENT: Negative for sore throat. Respiratory: Positive for cough. Cardiovascular: Positive for leg swelling. Genitourinary: Negative for dysuria. Musculoskeletal: Negative for arthralgias. Neurological: Positive for dizziness. All other systems reviewed and are negative. Physical Exam     Vitals:    09/18/21 2048   BP: (!) 115/57   Pulse: 84   Resp: 20   Temp: (!) 101.8 °F (38.8 °C)   SpO2: 95%   Weight: 83.5 kg (184 lb)   Height: 5' 4\" (1.626 m)     Physical Exam  Vitals and nursing note reviewed. Constitutional:       General: She is not in acute distress. Appearance: She is well-developed. She is ill-appearing. HENT:      Head: Normocephalic and atraumatic. Right Ear: External ear normal.      Left Ear: External ear normal.      Nose: Nose normal.      Mouth/Throat:      Mouth: Mucous membranes are moist.   Eyes:      Conjunctiva/sclera: Conjunctivae normal.   Cardiovascular:      Rate and Rhythm: Normal rate and regular rhythm. Heart sounds: Normal heart sounds. Pulmonary:      Effort: Pulmonary effort is normal. No respiratory distress. Breath sounds: Normal breath sounds. No wheezing. Comments: RLL diminished breath sounds  Abdominal:      General: Bowel sounds are normal.      Palpations: Abdomen is soft. Tenderness: There is no abdominal tenderness. Musculoskeletal:         General: Normal range of motion. Cervical back: Normal range of motion and neck supple. Lymphadenopathy:      Cervical: No cervical adenopathy. Skin:     General: Skin is warm and dry. Findings: No rash. Neurological:      Mental Status: She is alert and oriented to person, place, and time. Cranial Nerves: No cranial nerve deficit.       Coordination: Coordination normal.   Psychiatric:         Behavior: Behavior normal.         Thought Content: Thought content normal.         Judgment: Judgment normal.           Diagnostic Study Results     Labs -     Recent Results (from the past 12 hour(s))   HCG URINE, QL. - POC    Collection Time: 09/18/21  9:56 PM   Result Value Ref Range    Pregnancy test,urine (POC) Negative NEG     COVID-19 WITH INFLUENZA A/B    Collection Time: 09/18/21  9:57 PM   Result Value Ref Range    SARS-CoV-2 Not detected NOTD      Influenza A by PCR Not detected      Influenza B by PCR Not detected     CBC WITH AUTOMATED DIFF    Collection Time: 09/19/21 12:05 AM   Result Value Ref Range    WBC 8.9 3.6 - 11.0 K/uL    RBC 3.31 (L) 3.80 - 5.20 M/uL    HGB 8.6 (L) 11.5 - 16.0 g/dL    HCT 26.3 (L) 35.0 - 47.0 %    MCV 79.5 (L) 80.0 - 99.0 FL    MCH 26.0 26.0 - 34.0 PG    MCHC 32.7 30.0 - 36.5 g/dL    RDW 14.6 (H) 11.5 - 14.5 %    PLATELET 451 865 - 863 K/uL    MPV 9.2 8.9 - 12.9 FL    NRBC 0.0 0  WBC    ABSOLUTE NRBC 0.00 0.00 - 0.01 K/uL    NEUTROPHILS 68 32 - 75 %    LYMPHOCYTES 23 12 - 49 %    MONOCYTES 6 5 - 13 %    EOSINOPHILS 2 0 - 7 %    BASOPHILS 0 0 - 1 %    IMMATURE GRANULOCYTES 1 (H) 0.0 - 0.5 %    ABS. NEUTROPHILS 6.1 1.8 - 8.0 K/UL    ABS. LYMPHOCYTES 2.1 0.8 - 3.5 K/UL    ABS. MONOCYTES 0.6 0.0 - 1.0 K/UL    ABS. EOSINOPHILS 0.1 0.0 - 0.4 K/UL    ABS. BASOPHILS 0.0 0.0 - 0.1 K/UL    ABS. IMM. GRANS. 0.1 (H) 0.00 - 0.04 K/UL    DF AUTOMATED         Radiologic Studies -   No orders to display     CT Results  (Last 48 hours)    None        CXR Results  (Last 48 hours)               09/19/21 0024  XR CHEST PORT Final result    Impression:      Increased right lower lung pneumonia. Narrative:  EXAM:  XR CHEST PORT       INDICATION: Fever. Recent pneumonia. COMPARISON: 9/9/2021       TECHNIQUE: Portable AP upright chest view at 0020 hours       FINDINGS: The cardiomediastinal and hilar contours are within normal limits. The   pulmonary vasculature is within normal limits.         There is increased right lower lung airspace opacity. The left lung and pleural   spaces are clear. There is no pneumothorax. The visualized bones and upper   abdomen are age-appropriate. Medical Decision Making   I am the first provider for this patient. I reviewed the vital signs, available nursing notes, past medical history, past surgical history, family history and social history. Vital Signs-Reviewed the patient's vital signs. Records Reviewed: Nursing Notes and Old Medical Records    Provider Notes (Medical Decision Making):   DDX COVID-19 pyelonephritis pneumonia endocarditis sepsis          Disposition:  Plan of care had been discussed with patient. Patient insisting her boyfriend be in the room with her. Patient stated she wanted to leave. Patient signed out AMA. Follow-up Information     Follow up With Specialties Details Why Contact Info    Cam Asp, NP Nurse Practitioner In 1 day  [address] [place] [address]  [phone]            There are no discharge medications for this patient. Procedures:  Procedures    Please note that this dictation was completed with Dragon, computer voice recognition software. Quite often unanticipated grammatical, syntax, homophones, and other interpretive errors are inadvertently transcribed by the computer software. Please disregard these errors. Additionally, please excuse any errors that have escaped final proofreading. Diagnosis     Clinical Impression:   1.  Cough

## 2021-09-19 NOTE — ED NOTES
MULTIPLE ATTEMPTS HAVE BEEN MADE TO OBTAIN IV ACCESS. PT STATES THE MANY TRACK MARKS/BRUISES ALL OVER HER UPPER BODY ARE \"YEARS OLD\" WHEN SHE USED TO USE A DIFFERENT DOPE. PT UNWILLING TO ALLOW IV ATTEMPTS ANYWHERE BUT HER ARMS.

## 2021-09-19 NOTE — PROGRESS NOTES
**Physician Signature**  This document was electronically signed by: Ana Santos MD  09/19/2021 02:05 AM    **Consult Information**  Member Facility: [address] [place] [address] Rd., [address] MRN: [id number]  Consult ID: [id number]  Facility Time Zone: ET  Date and Time of Request: 09/19/2021 01:25:22 AM  ET  Requesting Clinician: Dr. Katherine Osman  Patient Name: Virginia Driscoll  YOB: 1990  Gender: Female    **Reason for Consult**  Reason for Consult: Admit: non-ICU    **Clinical Note**  Clinical Note: discussed with ed provider     per ed report -     Gianna Marshall is a 31 y.o. female with a PMH of hernia repair pneumonia pyelonephritis who presents with cough productive of dark brown mucus for the past 8 days. Patient states she was in his hospital last week and had pneumonia. Patient states she took [address] Difficult to elicit history from patient, patient is slow to respond. When asked if patient used any drugs she stated she did snort heroin earlier today. Patient also reports swelling of her ankles. Patient alert but somnolent and falls asleep when asked questions but arouses easily. Patient continually asking for her boyfriend to be with her in her room. \"    CXR -   \"IMPRESSION   Increased right lower lung pneumonia. \"    COVID NEG    FLU NEG    ----------------------    pt with findings as noted above in setting of bacterial pneumonia (like healthcare associated d/t recent hospitalization)    h/o elopement and leaving AMA    on vanc / zosyn / Donna Caleb    h/o heroin abuse --> will check 2D echo to assess for endocarditis    pt also has UTI / pyelonephritis --> f/u cultures    on IVF, antibiotics, supportive mgmt    further orders per clinical course and pending evaluation    **Attestation**  Interaction Mode: Phone Only  Phone Duration (mins): 6  Time of Call : 09/19/2021 01:53 AM  ET  Interaction Attestation: Interprofessional internet consultation was delivered through telephonic and/or electronic communication upon the request of the patients treating provider, while the requesting and the rendering provider were not in the same physical location. A written summary report was provided to the requesting provider at the originating site. Evaluation Duration (mins): 12    **Physician Signature**  This document was electronically signed by:  Reyna Garcia MD  09/19/2021 02:05 AM

## 2021-09-19 NOTE — PROGRESS NOTES
Problem: Falls - Risk of  Goal: *Absence of Falls  Description: Document Selin Pillow Fall Risk and appropriate interventions in the flowsheet.   Outcome: Progressing Towards Goal  Note: Fall Risk Interventions:  Mobility Interventions: Patient to call before getting OOB                             Problem: General Medical Care Plan  Goal: *Vital signs within specified parameters  Outcome: Progressing Towards Goal  Goal: *Labs within defined limits  Outcome: Progressing Towards Goal  Goal: *Absence of infection signs and symptoms  Outcome: Progressing Towards Goal  Goal: *Skin integrity maintained  Outcome: Progressing Towards Goal  Goal: *Fluid volume balance  Outcome: Progressing Towards Goal     Problem: Pneumonia: Day 1  Goal: Off Pathway (Use only if patient is Off Pathway)  Outcome: Progressing Towards Goal  Goal: Activity/Safety  Outcome: Progressing Towards Goal

## 2021-09-19 NOTE — ED TRIAGE NOTES
Patient returns to the ED after leaving AMA with continued complaints of a productive cough and stated when she left she started to sweat profusely. pts hair and body is wet on arrival to ED. Pt sleepy and having a hard time following basic commands.

## 2021-09-19 NOTE — PROGRESS NOTES
Pharmacy Antimicrobial Kinetic Dosing    Indication for Antimicrobials: HAP     Current Regimen of Each Antimicrobial:  Vancomycin, pharmacy to dose (Start Date ; Day # )  Zosyn 3.375g IV q8h (start , day 1)  Levofloxacin 750mg IV q24h (start ; day 1)    Previous Antimicrobial Therapy:    Goal Level: AUC: 400-600 mg/hr/Liter/day    Date Dose & Interval Measured (mcg/mL) Predicted AUC/RADHA                       Date & time of next level:     Dosing calculator used: Hyperpia calculator    Significant Positive Cultures:    ucx - >100k enterbacter aerogenes   pbcx -    bcx -    sputum -     Conditions for Dosing Consideration: None    Labs:  Recent Labs     21  0005   CREA 0.86   BUN 6     Recent Labs     21  0005   WBC 8.9     Temp (24hrs), Av.4 °F (37.4 °C), Min:98 °F (36.7 °C), Max:101.8 °F (38.8 °C)        Creatinine Clearance (mL/min):   CrCl (Ideal Body Weight): 84.1   If actual weight < IBW: CrCl (Actual Body Weight) 130.4    Impression/Plan:   Continue Zosyn as ordered  Continue levofloxacin as ordered  Will start vancomycin 2g IV x 1 followed by vancomycin 1250mg IV q12h to give an estimated AUC/tr at [facility]FF [facility]IG[facility] of 519/16  BMP daily  Antimicrobial stop date 7 days     Pharmacy will follow daily and adjust medications as appropriate for renal function and/or serum levels.     Thank you,  RANDALL RestrepoD    Vancomycin Dosing Document    Documents located on pharmacy Teams site: Clinical Practice -> Antimicrobial Stewardship -> Antibiotics_Vancomycin     Aminoglycoside Dosing Document    Documents located on pharmacy Teams site: Clinical Practice -> Antimicrobial Stewardship -> Antibiotics_Aminoglycosides

## 2021-09-19 NOTE — ED NOTES
PT WANDERED BACK INTO ED TO USE PHONE. ED FINLEY SPOKE WITH PT WHO IS CONTINUING TO REFUSE CARE. AMA EXPLAINED BY NP. PT VERBALIZED UNDERSTANDING AND SIGNED AMA. THIS NURSE WITNESSED. PT AMBULATORY OUT OF ED.

## 2021-09-19 NOTE — PROGRESS NOTES
80- TRANSFER - IN REPORT:    Verbal report received from WALLACE Richter (name) on [employer]  being received from ED (unit) for routine progression of care      Report consisted of patients Situation, Background, Assessment and   Recommendations(SBAR). Information from the following report(s) SBAR, Kardex and MAR was reviewed with the receiving nurse. Opportunity for questions and clarification was provided. Assessment completed upon patients arrival to unit and care assumed. Patient arrived via stretcher to . Attempted to draw multiple labs via art stick since patient is a very hard stick and ED department also had to draw labs via art stick. Shireen RT attempted to draw blood via art stick twice and unsuccessful. Dr. Jose Navarro notified and noted for labs to be drawn later in the day once patient receives IV fluids and bolus. Patient drowsy, sleepy. Only able to gather some admission information. Skin assessment deferred during initial assessment, patient refused due to wanting to sleep. When assisted to restroom noted patient had multiple needle marks all over upper and lower extremities. IV bolus of LR given and NS at 150ml/hr infusing. IV antibiotics of Zosyn and Levaquin as ordered. 0730- shift change report given to Jonathan Marie RN  (oncoming nurse) by Owen Corbett RN  (offgoing nurse). Report included the following information SBAR, Kardex and MAR.

## 2021-09-19 NOTE — ED NOTES
CHECKED ON PT IN BATHROOM. \"I'M TRYING. I HAVE A HARD TIME PEEING IN A CUP. \" PT ENCOURAGED TO PULL CALL LIGHT IF ASSISTANCE NEEDED.

## 2021-09-20 ENCOUNTER — APPOINTMENT (OUTPATIENT)
Dept: NON INVASIVE DIAGNOSTICS | Age: 31
DRG: 139 | End: 2021-09-20
Attending: FAMILY MEDICINE
Payer: MEDICAID

## 2021-09-20 ENCOUNTER — APPOINTMENT (OUTPATIENT)
Dept: GENERAL RADIOLOGY | Age: 31
DRG: 139 | End: 2021-09-20
Attending: ANESTHESIOLOGY
Payer: MEDICAID

## 2021-09-20 VITALS
SYSTOLIC BLOOD PRESSURE: 101 MMHG | OXYGEN SATURATION: 98 % | HEIGHT: 64 IN | WEIGHT: 187 LBS | HEART RATE: 82 BPM | RESPIRATION RATE: 18 BRPM | TEMPERATURE: 99.5 F | BODY MASS INDEX: 31.92 KG/M2 | DIASTOLIC BLOOD PRESSURE: 58 MMHG

## 2021-09-20 LAB
ATRIAL RATE: 83 BPM
CALCULATED P AXIS, ECG09: 71 DEGREES
CALCULATED R AXIS, ECG10: 61 DEGREES
CALCULATED T AXIS, ECG11: 58 DEGREES
DIAGNOSIS, 93000: NORMAL
P-R INTERVAL, ECG05: 148 MS
Q-T INTERVAL, ECG07: 368 MS
QRS DURATION, ECG06: 82 MS
QTC CALCULATION (BEZET), ECG08: 432 MS
VENTRICULAR RATE, ECG03: 83 BPM

## 2021-09-20 PROCEDURE — 74011250637 HC RX REV CODE- 250/637: Performed by: FAMILY MEDICINE

## 2021-09-20 PROCEDURE — 74011250637 HC RX REV CODE- 250/637: Performed by: STUDENT IN AN ORGANIZED HEALTH CARE EDUCATION/TRAINING PROGRAM

## 2021-09-20 PROCEDURE — 71045 X-RAY EXAM CHEST 1 VIEW: CPT

## 2021-09-20 PROCEDURE — 74011250637 HC RX REV CODE- 250/637: Performed by: HOSPITALIST

## 2021-09-20 RX ORDER — LEVOFLOXACIN 750 MG/1
750 TABLET ORAL ONCE
Status: COMPLETED | OUTPATIENT
Start: 2021-09-20 | End: 2021-09-20

## 2021-09-20 RX ORDER — METHADONE HYDROCHLORIDE 10 MG/1
30 TABLET ORAL ONCE
Status: COMPLETED | OUTPATIENT
Start: 2021-09-20 | End: 2021-09-20

## 2021-09-20 RX ADMIN — METHADONE HYDROCHLORIDE 30 MG: 10 TABLET ORAL at 10:27

## 2021-09-20 RX ADMIN — LEVOFLOXACIN 750 MG: 750 TABLET, FILM COATED ORAL at 05:05

## 2021-09-20 RX ADMIN — GUAIFENESIN AND CODEINE PHOSPHATE 5 ML: 100; 10 SOLUTION ORAL at 10:23

## 2021-09-20 RX ADMIN — GUAIFENESIN AND CODEINE PHOSPHATE 5 ML: 100; 10 SOLUTION ORAL at 02:13

## 2021-09-20 RX ADMIN — GUAIFENESIN 600 MG: 600 TABLET, EXTENDED RELEASE ORAL at 10:27

## 2021-09-20 NOTE — PROGRESS NOTES
Attempted Central Line insertion    Attempted R IJ after sterile prep. Patient felt pain and refused further neck attempts,    Attempted femoral but unable to find vein.

## 2021-09-20 NOTE — PROGRESS NOTES
This writer's message to on call provider:    Patient admitted for PNA and is a IVDU, is extremely difficult PIV stick. Mulitple attempts for PIV access in ED prior to admission resulted in 20 gauge, which has infiltrated. ER tech attempt to reinsert was unsuccessful. Patient on continuious IV fluids and IV antibiotics q 8 hours, next antibiotic due at 0300. Patient agrees to central line placement. Please order central line.

## 2021-09-20 NOTE — PROGRESS NOTES
Telemedicine cross cover:  After multiple attempts, anesthesiologist unable to place central line. Patient had IV antibiotic due at 0300 and continuous IV fluids, please advise.       - oral levaquin 750 mg once    - PICC Line in am

## 2021-09-20 NOTE — PROGRESS NOTES
Problem: Falls - Risk of  Goal: *Absence of Falls  Description: Document Miryam Ramírez Fall Risk and appropriate interventions in the flowsheet.   Outcome: Progressing Towards Goal  Note: Fall Risk Interventions:  Mobility Interventions: Patient to call before getting OOB         Medication Interventions: Teach patient to arise slowly                   Problem: General Medical Care Plan  Goal: *Vital signs within specified parameters  Outcome: Progressing Towards Goal  Goal: *Labs within defined limits  Outcome: Progressing Towards Goal  Goal: *Absence of infection signs and symptoms  Outcome: Progressing Towards Goal  Goal: *Optimal pain control at patient's stated goal  Outcome: Progressing Towards Goal  Goal: *Skin integrity maintained  Outcome: Progressing Towards Goal  Goal: *Fluid volume balance  Outcome: Progressing Towards Goal  Goal: *Optimize nutritional status  Outcome: Progressing Towards Goal  Goal: *Anxiety reduced or absent  Outcome: Progressing Towards Goal  Goal: *Progressive mobility and function (eg: ADL's)  Outcome: Progressing Towards Goal     Problem: Pneumonia: Day 2  Goal: Off Pathway (Use only if patient is Off Pathway)  Outcome: Progressing Towards Goal  Goal: Activity/Safety  Outcome: Progressing Towards Goal  Goal: Consults, if ordered  Outcome: Progressing Towards Goal  Goal: Diagnostic Test/Procedures  Outcome: Progressing Towards Goal  Goal: Nutrition/Diet  Outcome: Progressing Towards Goal  Goal: Discharge Planning  Outcome: Progressing Towards Goal  Goal: Medications  Outcome: Progressing Towards Goal  Goal: Respiratory  Outcome: Progressing Towards Goal  Goal: Treatments/Interventions/Procedures  Outcome: Progressing Towards Goal  Goal: Psychosocial  Outcome: Progressing Towards Goal  Goal: *Oxygen saturation within defined limits  Outcome: Progressing Towards Goal  Goal: *Hemodynamically stable  Outcome: Progressing Towards Goal  Goal: *Demonstrates progressive activity  Outcome: Progressing Towards Goal  Goal: *Tolerating diet  Outcome: Progressing Towards Goal  Goal: *Optimal pain control at patient's stated goal  Outcome: Progressing Towards Goal

## 2021-09-20 NOTE — PROGRESS NOTES
This writer's message to on call provider:    After multiple attempts, anesthesiologist unable to place central line. Patient had IV antibiotic due at 0300 and continuous IV fluids, please advise.

## 2021-09-20 NOTE — PROGRESS NOTES
Patients IV infiltrated, unsuccessful attempt at peripheral IV, patient does not want to be stuck again, only wants ultrasound guided. Contacted telemed provider to see about IV antibiotics and continuous IV fluids.

## 2021-09-20 NOTE — DISCHARGE SUMMARY
Hospitalist Discharge Summary     Patient ID:  Yusra Manning  [id number]  20 y.o.  1990    PCP on record: Yrn Damian NP    Admit date: 9/18/2021  Discharge date and time: 9/20/2021      Admission Diagnoses: Pneumonia [J18.9]    Discharge Diagnoses: Active Problems:    Pneumonia (9/19/2021)           Hospital Course:   Patient eloped from hospital on 9/20/2021. Pneumonia POA - failed outpatient treatment. Fever POA   Failed outpatient levofloxacin therapy. T-max 101.8. Chest x-ray independently interpreted reveals right lower lobe pneumonia. Lactic acid within normal limits. Covid, influenza test negative. Was treated with broad spectrum antibiotics. Overnight on 9/20, She lost IV access and central line placement was unsuccessful as well. We were awaiting US guided IV placement. Notified by nursing that patient walked out of her room around 11:30 AM stating she is \"tired of this shit\" and refused to wait for IV placement and medications or MD Assessment and discussion about leaving AMA       Hypokalemia POA  EKG interpreted independently - normal sinus rhythm, QTc 432 ms  - Repleted orally.        Heroin abuse POA  Cocaine abuse POA  Tobacco dependence POA  - Check UDS   - Patient was counseled extensively on the need to abstain from drugs its addictive tendencies, its deleterious effects on the brain, cardiovascular system, lungs as well as its financial & social sequelae  - nicotine patch declined  - Patient was counseled extensively on the need to abstain from tobacco, its addictive tendencies, its deleterious effects on the lungs, cardiovascular  as well as its financial & social sequelae           Microcytic anemia POA  Baseline Hgb ~ 10.  Current Hgb 8.6.  - check anemia panel - pending due to inability to obtain lab draw     CONSULTATIONS:  None    Excerpted HPI from H&P of Catalina Sahni, DO:  31 y.o.  female with PMH of pneumonia presents to ED with c/o productive cough, shortness of breath. Patient reports progressively worsening shortness of breath, subjective fevers, cough productive of dark brown mucus, fatigue, right-sided chest pain for the past 13 days. She was seen in the ER on 9/9 and was prescribed a 7-day course of Levaquin which she has completed but reports that it did not affect her symptoms. Pain in chest is sharp worsened by coughing and taking deep breaths. Reports waxing waning fevers and chills. Seen in the ER yesterday left AMA and returned within 12 hours of leaving.     We were asked to admit for work up and evaluation of the above problems. ______________________________________________________________________  DISCHARGE SUMMARY/HOSPITAL COURSE:  for full details see H&P, daily progress notes, labs, consult notes. Visit Vitals  BP (!) 101/58 (BP 1 Location: Left upper arm, BP Patient Position: Semi fowlers)   Pulse 82   Temp 99.5 °F (37.5 °C)   Resp 18   Ht 5' 4\" (1.626 m)   Wt 84.8 kg (187 lb)   SpO2 98%   BMI 32.10 kg/m²       _______________________________________________________________________  Patient did not wait to undergo MD Assessment  _______________________________________________________________________  DISCHARGE MEDICATIONS:   There are no discharge medications for this patient. My Recommended Diet, Activity, Wound Care, and follow-up labs are listed in the patient's Discharge Insturctions which I have personally completed and reviewed.     _______________________________________________________________________  DISPOSITION:     Home with Family: x   Home with HH/PT/OT/RN:    SNF/LTC:    RAF:    OTHER:        Condition at Discharge:  Stable  _______________________________________________________________________  Follow up with:   PCP : Tyler Mccoy NP  Follow-up Information     Follow up With Specialties Details Why Contact Info    Tyler Mccoy NP Nurse Practitioner   [address] [place] [address] [address] [facility] [address]  [phone]      [facility] Warm Line    CALL [phone]  Alive [facility] Warm Line                Total time in minutes spent coordinating this discharge (includes going over instructions, follow-up, prescriptions, and preparing report for sign off to her PCP) :  32 minutes    Signed:  Raissa Smith MD

## 2021-09-20 NOTE — PROGRESS NOTES
0800: Pt frustrated with BP cuff, inability to get a central line, refusing a PICC line and only wants an ultrasound guided line. Nursing supervisor made aware. Complaining of withdrawal and wants medicine. Does not want PRN clonidine. Will notify MD. All other vitals stable although visibly nauseous.  COWS = 8.

## 2021-09-20 NOTE — PROGRESS NOTES
shift change report given to Darylene Hitt, RN  (oncoming nurse) by Naomy Newton RN  (offgoing nurse). Report included the following information SBAR, Kardex and MAR. Patient resting quietly in bed at this time.

## 2021-09-21 ENCOUNTER — TELEPHONE (OUTPATIENT)
Dept: CASE MANAGEMENT | Age: 31
End: 2021-09-21

## 2021-09-21 LAB
BACTERIA SPEC CULT: NORMAL
GRAM STN SPEC: NORMAL
SERVICE CMNT-IMP: NORMAL

## 2021-09-21 NOTE — TELEPHONE ENCOUNTER
Hospital follow up  Case Management follow up call    CM called patient to follow up after she \"eloped\" from the medical unit. Spoke to patient. She was very upset about why the team could not get a IV in her arm for the antibiotics that's why she left. Not sent with any paper work or prescribed medications. Asked if she wanted CM to get her any follow ups and she refused. Stated, I have to go and hung up.      Alan Barnes, JIMMYN, [place], [place]  [phone]

## 2021-09-24 LAB
BACTERIA SPEC CULT: NORMAL
BACTERIA SPEC CULT: NORMAL
SERVICE CMNT-IMP: NORMAL
SERVICE CMNT-IMP: NORMAL

## 2021-09-24 NOTE — PROGRESS NOTES
Physician Progress Note      Topher Gant  CSN #:                  [id number]  :                       1990  ADMIT DATE:       2021 11:15 PM  [address] [place] DATE:        2021 11:00 AM  RESPONDING  PROVIDER #:        Ruby Dozier MD [facility] MD          QUERY TEXT:    Pt admitted with Cough. Pt noted to have Pneumonia. If possible, please document in the progress notes and discharge summary if you are evaluating and/or treating any of the following:    Note: CAP and HCAP indicate where the pneumonia was acquired, not a specific type. The medical record reflects the following:  Risk Factors: Hx: IVDU  Clinical Indicators: hr: 77; rr: 20; O2 Sats: 975 on RA; CXR: CXR: Increased RLL PNA;Lac acid: 0.5; Res. Cx: HEAVY NORMAL RESPIRATORY IMTIAZ; SARS-CoV-2 NOTD   Not detected; ED noted: Tachypnea and accessory muscle usage; Rhonchi and rales  Treatment: Labs; Resp. Cx; BCx; CXR; IV ABx- Vancomycin, cefepime; Antitussives; Trend temperature and WBC curve    Thank you,    Netta Champagne  [facility]  [phone]  Options provided:  -- Gram negative pneumonia  -- Gram positive pneumonia  -- Bacterial pneumonia  -- Other - I will add my own diagnosis  -- Disagree - Not applicable / Not valid  -- Disagree - Clinically unable to determine / Unknown  -- Refer to Clinical Documentation Reviewer    PROVIDER RESPONSE TEXT:    This patient has bacterial pneumonia.     Query created by: Mayra Pandey on 2021 12:37 PM      Electronically signed by:  Ruby Dozier MD [facility] MD 2021 6:30 PM

## 2022-03-18 PROBLEM — J18.9 PNEUMONIA: Status: ACTIVE | Noted: 2021-09-19

## 2024-09-23 ENCOUNTER — HOSPITAL ENCOUNTER (EMERGENCY)
Facility: HOSPITAL | Age: 34
Discharge: HOME | End: 2024-09-23
Attending: EMERGENCY MEDICINE
Payer: MEDICAID

## 2024-09-23 VITALS
HEIGHT: 64 IN | TEMPERATURE: 99.7 F | BODY MASS INDEX: 35.17 KG/M2 | SYSTOLIC BLOOD PRESSURE: 112 MMHG | RESPIRATION RATE: 14 BRPM | DIASTOLIC BLOOD PRESSURE: 70 MMHG | HEART RATE: 93 BPM | WEIGHT: 206 LBS | OXYGEN SATURATION: 97 %

## 2024-09-23 DIAGNOSIS — N76.0 BACTERIAL VAGINOSIS: Primary | ICD-10-CM

## 2024-09-23 DIAGNOSIS — B96.89 BACTERIAL VAGINOSIS: Primary | ICD-10-CM

## 2024-09-23 DIAGNOSIS — N39.0 URINARY TRACT INFECTION WITHOUT HEMATURIA, SITE UNSPECIFIED: ICD-10-CM

## 2024-09-23 LAB
APPEARANCE UR: ABNORMAL
BACTERIA URNS QL MICRO: ABNORMAL /HPF
BILIRUB UR QL: NEGATIVE
CLUE CELLS VAG QL WET PREP: ABNORMAL
COLOR UR: YELLOW
EPITH CASTS URNS QL MICRO: ABNORMAL /LPF
GLUCOSE UR STRIP.AUTO-MCNC: NEGATIVE MG/DL
HCG UR QL: NEGATIVE
HGB UR QL STRIP: ABNORMAL
KETONES UR QL STRIP.AUTO: NEGATIVE MG/DL
LEUKOCYTE ESTERASE UR QL STRIP.AUTO: ABNORMAL
NITRITE UR QL STRIP.AUTO: NEGATIVE
PH UR STRIP: 7 (ref 5–8)
PROT UR STRIP-MCNC: 100 MG/DL
RBC #/AREA URNS HPF: ABNORMAL /HPF (ref 0–5)
SP GR UR REFRACTOMETRY: 1 (ref 1–1.03)
T VAGINALIS VAG QL WET PREP: ABNORMAL
URINE CULTURE IF INDICATED: ABNORMAL
UROBILINOGEN UR QL STRIP.AUTO: 0.1 EU/DL (ref 0.2–1)
WBC URNS QL MICRO: ABNORMAL /HPF (ref 0–4)
YEAST: ABNORMAL

## 2024-09-23 PROCEDURE — 87491 CHLMYD TRACH DNA AMP PROBE: CPT

## 2024-09-23 PROCEDURE — 87591 N.GONORRHOEAE DNA AMP PROB: CPT

## 2024-09-23 PROCEDURE — 6360000002 HC RX W HCPCS: Performed by: EMERGENCY MEDICINE

## 2024-09-23 PROCEDURE — 87210 SMEAR WET MOUNT SALINE/INK: CPT

## 2024-09-23 PROCEDURE — 87086 URINE CULTURE/COLONY COUNT: CPT

## 2024-09-23 PROCEDURE — 6370000000 HC RX 637 (ALT 250 FOR IP): Performed by: EMERGENCY MEDICINE

## 2024-09-23 PROCEDURE — 81001 URINALYSIS AUTO W/SCOPE: CPT

## 2024-09-23 PROCEDURE — 99283 EMERGENCY DEPT VISIT LOW MDM: CPT

## 2024-09-23 PROCEDURE — 81025 URINE PREGNANCY TEST: CPT

## 2024-09-23 RX ORDER — DOXEPIN HYDROCHLORIDE 25 MG/1
50 CAPSULE ORAL
Status: DISCONTINUED | OUTPATIENT
Start: 2024-09-23 | End: 2024-09-24 | Stop reason: HOSPADM

## 2024-09-23 RX ORDER — OLANZAPINE 2.5 MG/1
2.5 TABLET, FILM COATED ORAL NIGHTLY
COMMUNITY
Start: 2024-09-19

## 2024-09-23 RX ORDER — ACETAMINOPHEN 500 MG
1000 TABLET ORAL
Status: COMPLETED | OUTPATIENT
Start: 2024-09-23 | End: 2024-09-23

## 2024-09-23 RX ORDER — CEPHALEXIN 500 MG/1
500 CAPSULE ORAL
Status: COMPLETED | OUTPATIENT
Start: 2024-09-23 | End: 2024-09-23

## 2024-09-23 RX ORDER — METRONIDAZOLE 500 MG/1
500 TABLET ORAL
Status: COMPLETED | OUTPATIENT
Start: 2024-09-23 | End: 2024-09-23

## 2024-09-23 RX ORDER — BUPRENORPHINE HYDROCHLORIDE AND NALOXONE HYDROCHLORIDE DIHYDRATE 8; 2 MG/1; MG/1
1 TABLET SUBLINGUAL
Status: COMPLETED | OUTPATIENT
Start: 2024-09-23 | End: 2024-09-23

## 2024-09-23 RX ORDER — DOXEPIN HYDROCHLORIDE 50 MG/1
50 CAPSULE ORAL
COMMUNITY
Start: 2024-09-17

## 2024-09-23 RX ORDER — METRONIDAZOLE 500 MG/1
500 TABLET ORAL 2 TIMES DAILY
Qty: 14 TABLET | Refills: 0 | Status: SHIPPED | OUTPATIENT
Start: 2024-09-23 | End: 2024-09-30

## 2024-09-23 RX ORDER — CEPHALEXIN 500 MG/1
500 CAPSULE ORAL 2 TIMES DAILY
Qty: 14 CAPSULE | Refills: 0 | Status: SHIPPED | OUTPATIENT
Start: 2024-09-23 | End: 2024-09-30

## 2024-09-23 RX ORDER — BUPRENORPHINE AND NALOXONE 8; 2 MG/1; MG/1
1 FILM, SOLUBLE BUCCAL; SUBLINGUAL DAILY
COMMUNITY

## 2024-09-23 RX ORDER — OLANZAPINE 2.5 MG/1
2.5 TABLET, FILM COATED ORAL
Status: DISCONTINUED | OUTPATIENT
Start: 2024-09-23 | End: 2024-09-24 | Stop reason: HOSPADM

## 2024-09-23 RX ORDER — CLONIDINE HYDROCHLORIDE 0.1 MG/1
0.1 TABLET ORAL
Status: COMPLETED | OUTPATIENT
Start: 2024-09-23 | End: 2024-09-23

## 2024-09-23 RX ORDER — GABAPENTIN 300 MG/1
CAPSULE ORAL
COMMUNITY
Start: 2024-09-17

## 2024-09-23 RX ADMIN — ACETAMINOPHEN 1000 MG: 500 TABLET ORAL at 20:46

## 2024-09-23 RX ADMIN — CEPHALEXIN 500 MG: 500 CAPSULE ORAL at 22:28

## 2024-09-23 RX ADMIN — METRONIDAZOLE 500 MG: 500 TABLET ORAL at 22:28

## 2024-09-23 RX ADMIN — CLONIDINE HYDROCHLORIDE 0.1 MG: 0.1 TABLET ORAL at 22:29

## 2024-09-23 RX ADMIN — BUPRENORPHINE HYDROCHLORIDE AND NALOXONE HYDROCHLORIDE DIHYDRATE 1 TABLET: 8; 2 TABLET SUBLINGUAL at 22:29

## 2024-09-23 ASSESSMENT — PAIN - FUNCTIONAL ASSESSMENT
PAIN_FUNCTIONAL_ASSESSMENT: NONE - DENIES PAIN
PAIN_FUNCTIONAL_ASSESSMENT: 0-10

## 2024-09-23 ASSESSMENT — PAIN DESCRIPTION - ORIENTATION: ORIENTATION: LOWER

## 2024-09-23 ASSESSMENT — PAIN DESCRIPTION - LOCATION: LOCATION: ABDOMEN

## 2024-09-23 ASSESSMENT — LIFESTYLE VARIABLES
HOW OFTEN DO YOU HAVE A DRINK CONTAINING ALCOHOL: 2-4 TIMES A MONTH
HOW MANY STANDARD DRINKS CONTAINING ALCOHOL DO YOU HAVE ON A TYPICAL DAY: PATIENT DOES NOT DRINK

## 2024-09-23 ASSESSMENT — PAIN SCALES - GENERAL: PAINLEVEL_OUTOF10: 4

## 2024-09-25 LAB
BACTERIA SPEC CULT: NORMAL
C TRACH DNA SPEC QL NAA+PROBE: NEGATIVE
Lab: NORMAL
N GONORRHOEA DNA SPEC QL NAA+PROBE: NEGATIVE
SAMPLE TYPE: NORMAL
SERVICE CMNT-IMP: NORMAL
SPECIMEN SOURCE: NORMAL